# Patient Record
Sex: FEMALE
[De-identification: names, ages, dates, MRNs, and addresses within clinical notes are randomized per-mention and may not be internally consistent; named-entity substitution may affect disease eponyms.]

---

## 2017-12-16 ENCOUNTER — LAB SERVICES (OUTPATIENT)
Dept: OTHER | Age: 37
End: 2017-12-16

## 2017-12-16 ENCOUNTER — CHARTING TRANS (OUTPATIENT)
Dept: OTHER | Age: 37
End: 2017-12-16

## 2017-12-16 LAB — RAPID STREP GROUP A: POSITIVE

## 2018-04-02 ENCOUNTER — LAB SERVICES (OUTPATIENT)
Dept: OTHER | Age: 38
End: 2018-04-02

## 2018-04-02 ENCOUNTER — CHARTING TRANS (OUTPATIENT)
Dept: OTHER | Age: 38
End: 2018-04-02

## 2018-04-02 LAB — RAPID STREP GROUP A: POSITIVE

## 2018-04-02 ASSESSMENT — PAIN SCALES - GENERAL: PAINLEVEL_OUTOF10: 6

## 2018-05-31 ENCOUNTER — HOSPITAL ENCOUNTER (OUTPATIENT)
Dept: ULTRASOUND IMAGING | Age: 38
Discharge: HOME | End: 2018-05-31
Attending: INTERNAL MEDICINE
Payer: COMMERCIAL

## 2018-05-31 ENCOUNTER — LAB ENCOUNTER (OUTPATIENT)
Dept: LAB | Age: 38
End: 2018-05-31
Attending: INTERNAL MEDICINE
Payer: COMMERCIAL

## 2018-05-31 ENCOUNTER — OFFICE VISIT (OUTPATIENT)
Dept: FAMILY MEDICINE CLINIC | Facility: CLINIC | Age: 38
End: 2018-05-31

## 2018-05-31 VITALS
HEART RATE: 78 BPM | RESPIRATION RATE: 16 BRPM | DIASTOLIC BLOOD PRESSURE: 70 MMHG | BODY MASS INDEX: 26.2 KG/M2 | TEMPERATURE: 98 F | WEIGHT: 163 LBS | HEIGHT: 66 IN | SYSTOLIC BLOOD PRESSURE: 114 MMHG

## 2018-05-31 DIAGNOSIS — E04.9 GOITER: ICD-10-CM

## 2018-05-31 DIAGNOSIS — E03.9 ACQUIRED HYPOTHYROIDISM: ICD-10-CM

## 2018-05-31 DIAGNOSIS — F41.9 ANXIETY: ICD-10-CM

## 2018-05-31 DIAGNOSIS — K58.0 IRRITABLE BOWEL SYNDROME WITH DIARRHEA: ICD-10-CM

## 2018-05-31 DIAGNOSIS — Z00.00 ANNUAL PHYSICAL EXAM: Primary | ICD-10-CM

## 2018-05-31 DIAGNOSIS — Z00.00 ANNUAL PHYSICAL EXAM: ICD-10-CM

## 2018-05-31 PROCEDURE — 99386 PREV VISIT NEW AGE 40-64: CPT | Performed by: INTERNAL MEDICINE

## 2018-05-31 PROCEDURE — 86003 ALLG SPEC IGE CRUDE XTRC EA: CPT | Performed by: INTERNAL MEDICINE

## 2018-05-31 PROCEDURE — 99203 OFFICE O/P NEW LOW 30 MIN: CPT | Performed by: INTERNAL MEDICINE

## 2018-05-31 PROCEDURE — 76536 US EXAM OF HEAD AND NECK: CPT | Performed by: INTERNAL MEDICINE

## 2018-05-31 PROCEDURE — 82306 VITAMIN D 25 HYDROXY: CPT | Performed by: INTERNAL MEDICINE

## 2018-05-31 PROCEDURE — 80050 GENERAL HEALTH PANEL: CPT | Performed by: INTERNAL MEDICINE

## 2018-05-31 PROCEDURE — 36415 COLL VENOUS BLD VENIPUNCTURE: CPT | Performed by: INTERNAL MEDICINE

## 2018-05-31 PROCEDURE — 80061 LIPID PANEL: CPT | Performed by: INTERNAL MEDICINE

## 2018-05-31 PROCEDURE — 84439 ASSAY OF FREE THYROXINE: CPT | Performed by: INTERNAL MEDICINE

## 2018-05-31 RX ORDER — HYDROXYZINE HYDROCHLORIDE 25 MG/1
25 TABLET, FILM COATED ORAL
COMMUNITY
Start: 2018-03-23 | End: 2018-05-31

## 2018-05-31 RX ORDER — HYDROXYZINE HYDROCHLORIDE 25 MG/1
TABLET, FILM COATED ORAL
Refills: 0 | COMMUNITY
Start: 2018-03-23 | End: 2018-05-31

## 2018-05-31 RX ORDER — VALACYCLOVIR HYDROCHLORIDE 1 G/1
TABLET, FILM COATED ORAL
Qty: 15 TABLET | Refills: 1 | Status: SHIPPED | OUTPATIENT
Start: 2018-05-31 | End: 2019-03-04

## 2018-05-31 RX ORDER — CLINDAMYCIN AND BENZOYL PEROXIDE 10; 50 MG/G; MG/G
GEL TOPICAL
COMMUNITY
Start: 2017-05-09 | End: 2018-08-13

## 2018-05-31 RX ORDER — VALACYCLOVIR HYDROCHLORIDE 1 G/1
TABLET, FILM COATED ORAL
COMMUNITY
Start: 2018-05-04 | End: 2018-05-31

## 2018-05-31 RX ORDER — BUPROPION HYDROCHLORIDE 150 MG/1
150 TABLET ORAL DAILY
Qty: 30 TABLET | Refills: 1 | Status: SHIPPED | OUTPATIENT
Start: 2018-05-31 | End: 2018-05-31

## 2018-05-31 RX ORDER — BUSPIRONE HYDROCHLORIDE 5 MG/1
TABLET ORAL
Qty: 15 TABLET | Refills: 0 | Status: SHIPPED | OUTPATIENT
Start: 2018-05-31 | End: 2018-08-13

## 2018-05-31 NOTE — PATIENT INSTRUCTIONS
LAB HOURS & LOCATIONS        [facility]    [facility]   [address] [place] [address]     ([address])         [address] [place], [place] [address]    Mon-Fri  5am-8pm     Mon-Fri   7am-4pm  Sat         6am-3pm     Sat          7am-3pm      Alcides oTribio

## 2018-06-02 RX ORDER — BUPROPION HYDROCHLORIDE 150 MG/1
TABLET ORAL
Qty: 90 TABLET | Refills: 1 | Status: SHIPPED | OUTPATIENT
Start: 2018-06-02 | End: 2018-08-13

## 2018-06-04 ENCOUNTER — TELEPHONE (OUTPATIENT)
Dept: FAMILY MEDICINE CLINIC | Facility: CLINIC | Age: 38
End: 2018-06-04

## 2018-06-04 NOTE — TELEPHONE ENCOUNTER
Pt called to request a call back regarding her US thyroid test results. Pt is asking for the next step is she due for a biopsy? Please call and advise.

## 2018-06-12 PROCEDURE — 88173 CYTOPATH EVAL FNA REPORT: CPT | Performed by: OTOLARYNGOLOGY

## 2018-06-18 NOTE — PROGRESS NOTES
HPI:   Jarrett Hatfield is a 34year old female, new to our practice,  who presents for an annual physical exam. Symptoms: denies discharge, itching, burning or dysuria, sees GYNE. Patient's last menstrual period was 05/25/2018.   Previous pap: up to date watches sugar closely     REVIEW OF SYSTEMS:   GENERAL: feels well otherwise  SKIN: denies unusual skin lesions  HEENT:no vision or hearing changes; denies nasal congestion, sinus pain or sore throat  LUNGS: denies shortness of breath, chest heaviness or c prn buspar. Risks & SEs discussed. 3. Acquired hypothyroidism  - update labs  - US THYROID (MSR=15120); Future    4. Goiter  - US THYROID (PLZ=93521); Future    5.  Irritable bowel syndrome with diarrhea  - routine exercise and stress relieving modaliti

## 2018-07-28 ENCOUNTER — HOSPITAL ENCOUNTER (OUTPATIENT)
Dept: MAMMOGRAPHY | Facility: HOSPITAL | Age: 38
Discharge: HOME | End: 2018-07-28
Attending: ADVANCED PRACTICE MIDWIFE
Payer: COMMERCIAL

## 2018-07-28 DIAGNOSIS — Z12.31 ENCOUNTER FOR SCREENING MAMMOGRAM FOR MALIGNANT NEOPLASM OF BREAST: ICD-10-CM

## 2018-07-28 PROCEDURE — 77067 SCR MAMMO BI INCL CAD: CPT | Performed by: ADVANCED PRACTICE MIDWIFE

## 2018-07-28 PROCEDURE — 77063 BREAST TOMOSYNTHESIS BI: CPT | Performed by: ADVANCED PRACTICE MIDWIFE

## 2018-08-07 ENCOUNTER — HOSPITAL ENCOUNTER (OUTPATIENT)
Dept: MAMMOGRAPHY | Facility: HOSPITAL | Age: 38
Discharge: HOME | End: 2018-08-07
Attending: ADVANCED PRACTICE MIDWIFE
Payer: COMMERCIAL

## 2018-08-07 DIAGNOSIS — R92.2 INCONCLUSIVE MAMMOGRAM: ICD-10-CM

## 2018-08-07 PROCEDURE — 77061 BREAST TOMOSYNTHESIS UNI: CPT | Performed by: ADVANCED PRACTICE MIDWIFE

## 2018-08-07 PROCEDURE — 77065 DX MAMMO INCL CAD UNI: CPT | Performed by: ADVANCED PRACTICE MIDWIFE

## 2018-08-07 PROCEDURE — 76642 ULTRASOUND BREAST LIMITED: CPT | Performed by: ADVANCED PRACTICE MIDWIFE

## 2018-08-13 PROBLEM — F41.9 ANXIETY: Status: ACTIVE | Noted: 2018-08-13

## 2018-08-13 NOTE — PROGRESS NOTES
HPI:   Claudeen Hudson is a 34year old female who presents for follow up of depression & anxiety. She is doing well on medication. Denies side effects. Not seeing a psychologist.   Denies suicidal and homicidal thoughts.   Used Buspar in the beginning, h ASSESSMENT AND PLAN:   Anxiety/Depression, stable-   Continue wellbutrin 150mg XR as directed. May increase to 2 tabs daily to see if she feels better on 300mg. Buspar prn. Expected course and possible side effects reviewed.  Reinforced stress managem

## 2018-08-13 NOTE — PATIENT INSTRUCTIONS
Treating Hemorrhoids: Self-Care    Follow your healthcare provider’s advice about caring for your hemorrhoids at home. Some treatments help relieve symptoms right away. Others involve making changes in your diet and exercise habits.  These can help ease c Adding fiber to your diet can help relieve constipation by making stools softer and easier to pass. To increase your fiber intake, your healthcare provider may recommend a bulking agent, such as psyllium.  This is a high-fiber supplement available at most g The tips below offer some simple ways to add more high-fiber foods to your meals. · Start your day with a high-fiber breakfast. Eat a wheat bran cereal along with a sliced banana.  Or, try peanut butter on whole-wheat toast.  · Eat carrot sticks for snacks

## 2018-10-29 ENCOUNTER — APPOINTMENT (OUTPATIENT)
Dept: LAB | Age: 38
End: 2018-10-29
Attending: NURSE PRACTITIONER
Payer: COMMERCIAL

## 2018-10-29 ENCOUNTER — OFFICE VISIT (OUTPATIENT)
Dept: INTERNAL MEDICINE CLINIC | Facility: CLINIC | Age: 38
End: 2018-10-29
Payer: COMMERCIAL

## 2018-10-29 VITALS
HEIGHT: 65 IN | SYSTOLIC BLOOD PRESSURE: 110 MMHG | HEART RATE: 60 BPM | WEIGHT: 170 LBS | RESPIRATION RATE: 16 BRPM | BODY MASS INDEX: 28.32 KG/M2 | DIASTOLIC BLOOD PRESSURE: 70 MMHG

## 2018-10-29 DIAGNOSIS — E66.3 OVERWEIGHT (BMI 25.0-29.9): ICD-10-CM

## 2018-10-29 DIAGNOSIS — E55.9 VITAMIN D DEFICIENCY: ICD-10-CM

## 2018-10-29 DIAGNOSIS — R53.83 TIREDNESS: ICD-10-CM

## 2018-10-29 DIAGNOSIS — Z51.81 ENCOUNTER FOR THERAPEUTIC DRUG MONITORING: ICD-10-CM

## 2018-10-29 DIAGNOSIS — F43.9 STRESS: ICD-10-CM

## 2018-10-29 DIAGNOSIS — Z51.81 ENCOUNTER FOR THERAPEUTIC DRUG MONITORING: Primary | ICD-10-CM

## 2018-10-29 PROCEDURE — 99204 OFFICE O/P NEW MOD 45 MIN: CPT | Performed by: NURSE PRACTITIONER

## 2018-10-29 PROCEDURE — 82607 VITAMIN B-12: CPT | Performed by: NURSE PRACTITIONER

## 2018-10-29 PROCEDURE — 82397 CHEMILUMINESCENT ASSAY: CPT | Performed by: NURSE PRACTITIONER

## 2018-10-29 PROCEDURE — 83036 HEMOGLOBIN GLYCOSYLATED A1C: CPT | Performed by: NURSE PRACTITIONER

## 2018-10-29 PROCEDURE — 93000 ELECTROCARDIOGRAM COMPLETE: CPT | Performed by: NURSE PRACTITIONER

## 2018-10-29 PROCEDURE — 82306 VITAMIN D 25 HYDROXY: CPT | Performed by: NURSE PRACTITIONER

## 2018-10-29 RX ORDER — PHENTERMINE HYDROCHLORIDE 15 MG/1
15 CAPSULE ORAL EVERY MORNING
Qty: 30 CAPSULE | Refills: 1 | Status: SHIPPED | OUTPATIENT
Start: 2018-10-29 | End: 2018-12-10

## 2018-10-29 RX ORDER — FLUOXETINE HYDROCHLORIDE 20 MG/1
CAPSULE ORAL
Qty: 90 CAPSULE | Refills: 0 | Status: SHIPPED | OUTPATIENT
Start: 2018-10-29 | End: 2018-12-10

## 2018-10-29 RX ORDER — FLUOXETINE HYDROCHLORIDE 20 MG/1
20 CAPSULE ORAL DAILY
Qty: 30 CAPSULE | Refills: 1 | Status: SHIPPED | OUTPATIENT
Start: 2018-10-29 | End: 2018-10-29

## 2018-10-29 NOTE — PROGRESS NOTES
HISTORY OF PRESENT ILLNESS  Patient presents with:  Weight Problem: patient referred by   HENRIK Ward is a 34year old female new to our office today for initiation of medical weight loss program.  Patient presents today wi obesity in parent/s or sibling: ?     REVIEW OF SYSTEMS  GENERAL: feels well otherwise, +fatigue  SKIN: denies rashes to skin folds  HEENT: denies snoring  LUNGS: denies shortness of breath with exertion, no apnea  CARDIOVASCULAR: denies chest pain on exert 05/31/2018    ALB 3.9 05/31/2018     05/31/2018    K 4.1 05/31/2018     05/31/2018    CO2 30.0 05/31/2018     No results found for: EAG, A1C  Lab Results   Component Value Date    CHOLEST 192 05/31/2018    TRIG 82 05/31/2018    HDL 52 05/31/201 Future    Tiredness  - multiple factors, could be stress, nutrient deficient  -     HEMOGLOBIN A1C; Future  -     VITAMIN B12; Future    Stress  - see above overweight plan  - CALM raphael recommended, offered counseling services- pt defers  -     FLUoxetine H as well as rice, pasta, potatoes and bread and make sure to choose whole grain options when having them.   4.  Start a daily probiotic: VSL#3, 1 capsule daily with water- find this over the counter behind the pharmacist. This can be left out of the fridge f

## 2018-10-29 NOTE — PATIENT INSTRUCTIONS
Welcome to the [facility]...your Lifestyle Renovation begins now! Thank you for placing your trust in our health care team, I look forward to working with you along this journey to better health!     Next steps:     1.  Sched progress as able with long-term goal of 30 minutes 5 days a week at a minimum. Meditation daily can help manage and control stress. Chronic stress can make weight loss difficult.   Exercising is one way to help with stress, but meditation using the CALM

## 2018-11-01 VITALS
TEMPERATURE: 99.5 F | RESPIRATION RATE: 18 BRPM | WEIGHT: 160 LBS | BODY MASS INDEX: 26.66 KG/M2 | HEART RATE: 68 BPM | HEIGHT: 65 IN | DIASTOLIC BLOOD PRESSURE: 68 MMHG | SYSTOLIC BLOOD PRESSURE: 118 MMHG

## 2018-11-02 VITALS
BODY MASS INDEX: 26.74 KG/M2 | OXYGEN SATURATION: 97 % | WEIGHT: 160.49 LBS | RESPIRATION RATE: 16 BRPM | TEMPERATURE: 99.3 F | HEART RATE: 102 BPM | HEIGHT: 65 IN

## 2018-11-02 DIAGNOSIS — E55.9 VITAMIN D DEFICIENCY: Primary | ICD-10-CM

## 2018-11-02 RX ORDER — ERGOCALCIFEROL 1.25 MG/1
50000 CAPSULE ORAL WEEKLY
Qty: 12 CAPSULE | Refills: 0 | Status: SHIPPED | OUTPATIENT
Start: 2018-11-02 | End: 2019-02-18

## 2018-12-10 ENCOUNTER — OFFICE VISIT (OUTPATIENT)
Dept: INTERNAL MEDICINE CLINIC | Facility: CLINIC | Age: 38
End: 2018-12-10
Payer: COMMERCIAL

## 2018-12-10 VITALS
BODY MASS INDEX: 26.16 KG/M2 | RESPIRATION RATE: 16 BRPM | WEIGHT: 157 LBS | SYSTOLIC BLOOD PRESSURE: 100 MMHG | HEIGHT: 65 IN | DIASTOLIC BLOOD PRESSURE: 60 MMHG | HEART RATE: 68 BPM

## 2018-12-10 DIAGNOSIS — E55.9 VITAMIN D DEFICIENCY: ICD-10-CM

## 2018-12-10 DIAGNOSIS — Z51.81 ENCOUNTER FOR THERAPEUTIC DRUG MONITORING: Primary | ICD-10-CM

## 2018-12-10 DIAGNOSIS — E66.3 OVERWEIGHT (BMI 25.0-29.9): ICD-10-CM

## 2018-12-10 DIAGNOSIS — F43.9 STRESS: ICD-10-CM

## 2018-12-10 PROCEDURE — 99214 OFFICE O/P EST MOD 30 MIN: CPT | Performed by: NURSE PRACTITIONER

## 2018-12-10 RX ORDER — PHENTERMINE HYDROCHLORIDE 15 MG/1
15 CAPSULE ORAL EVERY MORNING
Qty: 30 CAPSULE | Refills: 0 | Status: SHIPPED | OUTPATIENT
Start: 2018-12-10 | End: 2019-02-05 | Stop reason: DRUGHIGH

## 2018-12-10 RX ORDER — FLUOXETINE HYDROCHLORIDE 20 MG/1
CAPSULE ORAL
Qty: 90 CAPSULE | Refills: 0 | Status: SHIPPED | OUTPATIENT
Start: 2018-12-10 | End: 2019-03-04

## 2018-12-10 NOTE — PATIENT INSTRUCTIONS
Congratulations on your 13# weight loss! Continue making lifestyle changes that focus on good nutrition, regular exercise and stress management. Today we reviewed your labs with findings of: Low Vitamin D- continue/start Vitamin D weekly as prescribed. Doctor @ www. dietdoctor. com - low carb swaps    Stress Management/Behavior  · CALM meditation raphael  · Headspace  · Podcast: The Bonnie Flores with Phuc Fox    Books/Video Education  · Mindless Eating by Shahida Trimble  · The Complete Guide

## 2018-12-10 NOTE — PROGRESS NOTES
Vasquez Villa is a 34year old female presents today for 1 month follow-up on medical weight loss program for the treatment of overweight, obesity, or morbid obesity with associated vitamin d deficiency.     S:  Current weight Wt Readings from Last 6 Encoun affect    ASSESSMENT AND PLAN:  Encounter for therapeutic drug monitoring  (primary encounter diagnosis)  Overweight (bmi 25.0-29. 9)  Stress  Vitamin d deficiency    No orders of the defined types were placed in this encounter.       Meds & Refills for this therapy department and led by a /exercise physiologist. Visit our PT department downstairs or call [phone] to get started. · [employer] @ [email or web address] [email or web address].  Personal training and Group Fitness classes with [employer]

## 2019-02-02 ENCOUNTER — WALK IN (OUTPATIENT)
Dept: URGENT CARE | Age: 39
End: 2019-02-02

## 2019-02-02 VITALS
BODY MASS INDEX: 25.83 KG/M2 | RESPIRATION RATE: 16 BRPM | SYSTOLIC BLOOD PRESSURE: 110 MMHG | HEART RATE: 84 BPM | HEIGHT: 65 IN | OXYGEN SATURATION: 100 % | DIASTOLIC BLOOD PRESSURE: 72 MMHG | TEMPERATURE: 98.5 F | WEIGHT: 155 LBS

## 2019-02-02 DIAGNOSIS — R05.9 COUGH: ICD-10-CM

## 2019-02-02 DIAGNOSIS — J06.9 VIRAL URI: Primary | ICD-10-CM

## 2019-02-02 PROCEDURE — 99203 OFFICE O/P NEW LOW 30 MIN: CPT | Performed by: OBSTETRICS & GYNECOLOGY

## 2019-02-02 RX ORDER — BUPROPION HYDROCHLORIDE 150 MG/1
150 TABLET ORAL DAILY
COMMUNITY

## 2019-02-02 RX ORDER — BENZONATATE 200 MG/1
200 CAPSULE ORAL 3 TIMES DAILY PRN
Qty: 30 CAPSULE | Refills: 0 | Status: SHIPPED | OUTPATIENT
Start: 2019-02-02 | End: 2019-02-12

## 2019-02-02 ASSESSMENT — ENCOUNTER SYMPTOMS
WHEEZING: 0
DIAPHORESIS: 0
FEVER: 0
NAUSEA: 0
SHORTNESS OF BREATH: 0
VOMITING: 0
SORE THROAT: 0
DIARRHEA: 0
CHILLS: 0
COUGH: 1

## 2019-02-04 ENCOUNTER — OFFICE VISIT (OUTPATIENT)
Dept: INTERNAL MEDICINE CLINIC | Facility: CLINIC | Age: 39
End: 2019-02-04
Payer: COMMERCIAL

## 2019-02-04 VITALS — BODY MASS INDEX: 26 KG/M2 | WEIGHT: 158 LBS

## 2019-02-04 DIAGNOSIS — E66.3 OVERWEIGHT WITH BODY MASS INDEX (BMI) 25.0-29.9: Primary | ICD-10-CM

## 2019-02-04 PROCEDURE — 97802 MEDICAL NUTRITION INDIV IN: CPT | Performed by: DIETITIAN, REGISTERED

## 2019-02-04 NOTE — PROGRESS NOTES
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 Weight change: Increase of 1 lbs in the past month    Current Diet: Whole 30 initial phase, grain, sugar and dairy free    Meal pattern: 1-2 meals/d, ?  snacks/d    Number of meals/week eaten at restaurants: 1-2, w

## 2019-02-05 ENCOUNTER — OFFICE VISIT (OUTPATIENT)
Dept: INTERNAL MEDICINE CLINIC | Facility: CLINIC | Age: 39
End: 2019-02-05
Payer: COMMERCIAL

## 2019-02-05 VITALS
SYSTOLIC BLOOD PRESSURE: 100 MMHG | HEIGHT: 65 IN | HEART RATE: 70 BPM | DIASTOLIC BLOOD PRESSURE: 60 MMHG | WEIGHT: 156 LBS | BODY MASS INDEX: 25.99 KG/M2 | RESPIRATION RATE: 14 BRPM

## 2019-02-05 DIAGNOSIS — Z51.81 ENCOUNTER FOR THERAPEUTIC DRUG MONITORING: Primary | ICD-10-CM

## 2019-02-05 DIAGNOSIS — E66.3 OVERWEIGHT (BMI 25.0-29.9): ICD-10-CM

## 2019-02-05 PROCEDURE — 99213 OFFICE O/P EST LOW 20 MIN: CPT | Performed by: NURSE PRACTITIONER

## 2019-02-05 RX ORDER — PHENTERMINE HYDROCHLORIDE 15 MG/1
15 CAPSULE ORAL EVERY MORNING
Qty: 30 CAPSULE | Refills: 0 | Status: CANCELLED | OUTPATIENT
Start: 2019-02-05

## 2019-02-05 RX ORDER — PHENTERMINE HYDROCHLORIDE 37.5 MG/1
37.5 TABLET ORAL EVERY MORNING
Qty: 30 TABLET | Refills: 0 | Status: SHIPPED | OUTPATIENT
Start: 2019-02-05 | End: 2019-04-29

## 2019-02-05 NOTE — PROGRESS NOTES
Prashant Prajapati is a 34year old female presents today for 3 month follow-up on medical weight loss program for the treatment of overweight, obesity, or morbid obesity with associated vitamin d deficiency.     S:  Current weight Wt Readings from Last 6 Encoun intact  PSYCH: pleasant, cooperative, normal mood and affect    ASSESSMENT AND PLAN:  Encounter for therapeutic drug monitoring  (primary encounter diagnosis)  Overweight (bmi 25.0-29. 9)    No orders of the defined types were placed in this encounter. after a meal), whereas emotional eating and cravings usually come on very suddenly.  When you are genuinely hungry, you may experience one or several of the symptoms listed below:  Stomach pangs or growling   Emptiness in the stomach   Irritability   Headac ___________________________________________________________________  Taken from the American Diabetes Association @ www.diabetes. org. Last Edited: March 19, 2014, reviewed December 17, 2013            Medication use and SEs reviewed with patient.     Ret

## 2019-02-05 NOTE — PATIENT INSTRUCTIONS
Resume making lifestyle changes that focus on good nutrition, regular exercise and stress management. Medication Plan: Continue current medication regimen aside from increase phentermine dose.     Agreed upon goal/s before next office visit include: Orion Stewart eating in response to an emotion or because you are experiencing physical hunger? Think of alternatives to eating for when these temptations arise.  Some ideas are:  Drink a glass of cold water or another zero-calorie beverage   Take a walk to change the s

## 2019-02-09 DIAGNOSIS — E55.9 VITAMIN D DEFICIENCY: ICD-10-CM

## 2019-02-11 RX ORDER — ERGOCALCIFEROL 1.25 MG/1
CAPSULE ORAL
Qty: 12 CAPSULE | Refills: 0 | OUTPATIENT
Start: 2019-02-11

## 2019-02-11 NOTE — TELEPHONE ENCOUNTER
Requesting ergocalciferol 59413 units Oral    LOV: 2/5/19    RTC: 1 mth  Last Relevant Labs: 10/29/18  Filled: 11/2/18 # 12 with 0 refills    Future Appointments   Date Time Provider Gregorio Del Valle   3/6/2019 10:00 AM RYAN Herman [facility]

## 2019-02-18 DIAGNOSIS — E55.9 VITAMIN D DEFICIENCY: ICD-10-CM

## 2019-02-18 RX ORDER — BUPROPION HYDROCHLORIDE 150 MG/1
TABLET ORAL
Qty: 90 TABLET | Refills: 0 | OUTPATIENT
Start: 2019-02-18

## 2019-02-19 RX ORDER — ERGOCALCIFEROL 1.25 MG/1
CAPSULE ORAL
Qty: 12 CAPSULE | Refills: 0 | Status: SHIPPED | OUTPATIENT
Start: 2019-02-19 | End: 2019-07-11

## 2019-02-19 NOTE — TELEPHONE ENCOUNTER
Requesting Vitamin D  LOV: 2/5/19  RTC: 1 month  Last Relevant Labs: 10/29/18  Filled: 11/2/18 #12 with 0 refills    Future Appointments   Date Time Provider Gregorio Del Valle   3/6/2019 10:00 AM RYAN Cutler [facility]     Is patient to

## 2019-03-05 NOTE — PROGRESS NOTES
HPI:   Natalie Casanova is a 37year old female who presents for follow up of depression & anxiety. She is doing very well on medication. Denies side effects. Not seeing a psychologist.   Denies suicidal and homicidal thoughts.     Feeling like she's in a REFILLS FOR THIS VISIT:     Requested Prescriptions     Signed Prescriptions Disp Refills   • buPROPion HCl ER, XL, 150 MG Oral Tablet 24 Hr 90 tablet 2     Sig: TAKE 1 TABLET(150 MG) BY MOUTH DAILY   • FLUoxetine HCl 20 MG Oral Cap 90 capsule 1     Sig: T

## 2019-04-29 ENCOUNTER — OFFICE VISIT (OUTPATIENT)
Dept: INTERNAL MEDICINE CLINIC | Facility: CLINIC | Age: 39
End: 2019-04-29
Payer: COMMERCIAL

## 2019-04-29 VITALS
BODY MASS INDEX: 26.33 KG/M2 | HEART RATE: 76 BPM | DIASTOLIC BLOOD PRESSURE: 80 MMHG | RESPIRATION RATE: 14 BRPM | HEIGHT: 65 IN | SYSTOLIC BLOOD PRESSURE: 120 MMHG | WEIGHT: 158 LBS

## 2019-04-29 DIAGNOSIS — Z71.82 EXERCISE COUNSELING: ICD-10-CM

## 2019-04-29 DIAGNOSIS — Z71.3 DIETARY COUNSELING AND SURVEILLANCE: ICD-10-CM

## 2019-04-29 DIAGNOSIS — E66.3 OVERWEIGHT (BMI 25.0-29.9): ICD-10-CM

## 2019-04-29 DIAGNOSIS — Z51.81 ENCOUNTER FOR THERAPEUTIC DRUG MONITORING: Primary | ICD-10-CM

## 2019-04-29 DIAGNOSIS — Z71.3 WEIGHT LOSS COUNSELING, ENCOUNTER FOR: ICD-10-CM

## 2019-04-29 PROCEDURE — 99213 OFFICE O/P EST LOW 20 MIN: CPT | Performed by: NURSE PRACTITIONER

## 2019-04-29 RX ORDER — PHENTERMINE HYDROCHLORIDE 37.5 MG/1
37.5 TABLET ORAL EVERY MORNING
Qty: 30 TABLET | Refills: 2 | Status: SHIPPED | OUTPATIENT
Start: 2019-04-29 | End: 2019-07-11

## 2019-04-29 NOTE — PROGRESS NOTES
Prashant Prajapati is a 37year old female presents today for 6 month follow-up on medical weight loss program for the treatment of overweight, obesity, or morbid obesity with associated vitamin d deficiency.     S:  Current weight Wt Readings from Last 6 Encoun counseling, encounter for  Dietary counseling and surveillance  Exercise counseling    No orders of the defined types were placed in this encounter.       Meds & Refills for this Visit:  Requested Prescriptions     Signed Prescriptions Disp Refills   • Phen weight loss has ranged from 1 year to 66 years!  o Some have lost the weight rapidly, while others have lost weight very slowly--over as many as 14 years.       • We have also started to learn about how the weight loss was accomplished: 45% of registry part Exertion/Intensity on a scale of 1-5, with 5 being very intense: 3-4    Stress/Sleep:    Average Stress Level on scale of 1-10, with 10 being extremely stressed: variable/10    Average Hours of uninterrupted sleep per night: 5-6 hours/night    Additional L

## 2019-04-29 NOTE — PATIENT INSTRUCTIONS
Resume making lifestyle changes that focus on good nutrition, regular exercise and stress management. Medication Plan: Continue current medication regimen. Option to add medication if difficulty with portions persists.     Agreed upon goal/s before next per day. [facility] Control Registry @ www. [facility]. ws

## 2019-07-11 ENCOUNTER — OFFICE VISIT (OUTPATIENT)
Dept: FAMILY MEDICINE CLINIC | Facility: CLINIC | Age: 39
End: 2019-07-11
Payer: COMMERCIAL

## 2019-07-11 VITALS
SYSTOLIC BLOOD PRESSURE: 122 MMHG | BODY MASS INDEX: 25.16 KG/M2 | TEMPERATURE: 98 F | DIASTOLIC BLOOD PRESSURE: 82 MMHG | WEIGHT: 151 LBS | RESPIRATION RATE: 18 BRPM | OXYGEN SATURATION: 98 % | HEIGHT: 65 IN | HEART RATE: 104 BPM

## 2019-07-11 DIAGNOSIS — F41.9 ANXIETY: ICD-10-CM

## 2019-07-11 DIAGNOSIS — Z00.00 ROUTINE GENERAL MEDICAL EXAMINATION AT A HEALTH CARE FACILITY: Primary | ICD-10-CM

## 2019-07-11 DIAGNOSIS — E04.2 MULTIPLE THYROID NODULES: ICD-10-CM

## 2019-07-11 PROCEDURE — 99396 PREV VISIT EST AGE 40-64: CPT | Performed by: INTERNAL MEDICINE

## 2019-07-11 RX ORDER — VALACYCLOVIR HYDROCHLORIDE 1 G/1
TABLET, FILM COATED ORAL
Qty: 15 TABLET | Refills: 3 | Status: SHIPPED | OUTPATIENT
Start: 2019-07-11 | End: 2021-09-07

## 2019-07-11 RX ORDER — FLUOXETINE 10 MG/1
10 TABLET, FILM COATED ORAL DAILY
Qty: 90 TABLET | Refills: 3 | Status: SHIPPED | OUTPATIENT
Start: 2019-07-11 | End: 2020-02-19

## 2019-07-11 NOTE — PROGRESS NOTES
HPI:   Leonardo Mcqueen is a 37year old female who presents for an annual physical. Symptoms: denies discharge, itching, burning or dysuria, periods are regular, flow is 5 days. Patient's last menstrual period was 06/17/2019.   Previous pap: 2019 normal pe yes.   Exercise: three times per week.   Diet: watches fats closely and low carb diet     REVIEW OF SYSTEMS:   GENERAL: feels well otherwise  SKIN: denies unusual skin lesions  HEENT:no vision or hearing changes; denies nasal congestion, sinus pain or sore Future  - LIPID PANEL; Future  - TSH W REFLEX TO FREE T4; Future  - VITAMIN D, 25-HYDROXY; Future    2. Multiple thyroid nodules  - US THYROID (VAS=07176); Future    3. Anxiety, stable  Reduce fluoxetine to 10mg daily, may cut down to 5mg if tolerable.   Eduard Herreras

## 2019-08-06 ENCOUNTER — HOSPITAL ENCOUNTER (OUTPATIENT)
Dept: ULTRASOUND IMAGING | Facility: HOSPITAL | Age: 39
Discharge: HOME | End: 2019-08-06
Attending: INTERNAL MEDICINE
Payer: COMMERCIAL

## 2019-08-06 ENCOUNTER — LAB ENCOUNTER (OUTPATIENT)
Dept: LAB | Facility: HOSPITAL | Age: 39
End: 2019-08-06
Attending: INTERNAL MEDICINE
Payer: COMMERCIAL

## 2019-08-06 DIAGNOSIS — R79.89 ABNORMAL THYROID BLOOD TEST: Primary | ICD-10-CM

## 2019-08-06 DIAGNOSIS — Z00.00 ROUTINE GENERAL MEDICAL EXAMINATION AT A HEALTH CARE FACILITY: ICD-10-CM

## 2019-08-06 DIAGNOSIS — E04.2 MULTIPLE THYROID NODULES: ICD-10-CM

## 2019-08-06 LAB
ALBUMIN SERPL-MCNC: 3.7 G/DL (ref 3.4–5)
ALBUMIN/GLOB SERPL: 1 {RATIO} (ref 1–2)
ALP LIVER SERPL-CCNC: 72 U/L (ref 37–98)
ALT SERPL-CCNC: 17 U/L (ref 13–56)
ANION GAP SERPL CALC-SCNC: 5 MMOL/L (ref 0–18)
AST SERPL-CCNC: 14 U/L (ref 15–37)
BASOPHILS # BLD AUTO: 0.04 X10(3) UL (ref 0–0.2)
BASOPHILS NFR BLD AUTO: 0.7 %
BILIRUB SERPL-MCNC: 0.8 MG/DL (ref 0.1–2)
BUN BLD-MCNC: 12 MG/DL (ref 7–18)
BUN/CREAT SERPL: 15.8 (ref 10–20)
CALCIUM BLD-MCNC: 8.4 MG/DL (ref 8.5–10.1)
CHLORIDE SERPL-SCNC: 103 MMOL/L (ref 98–112)
CHOLEST SMN-MCNC: 220 MG/DL (ref ?–200)
CO2 SERPL-SCNC: 30 MMOL/L (ref 21–32)
CREAT BLD-MCNC: 0.76 MG/DL (ref 0.55–1.02)
DEPRECATED RDW RBC AUTO: 43.8 FL (ref 35.1–46.3)
EOSINOPHIL # BLD AUTO: 0.28 X10(3) UL (ref 0–0.7)
EOSINOPHIL NFR BLD AUTO: 4.8 %
ERYTHROCYTE [DISTWIDTH] IN BLOOD BY AUTOMATED COUNT: 12.7 % (ref 11–15)
GLOBULIN PLAS-MCNC: 3.7 G/DL (ref 2.8–4.4)
GLUCOSE BLD-MCNC: 87 MG/DL (ref 70–99)
HCT VFR BLD AUTO: 42 % (ref 35–48)
HDLC SERPL-MCNC: 64 MG/DL (ref 40–59)
HGB BLD-MCNC: 13.5 G/DL (ref 12–16)
IMM GRANULOCYTES # BLD AUTO: 0.02 X10(3) UL (ref 0–1)
IMM GRANULOCYTES NFR BLD: 0.3 %
LDLC SERPL CALC-MCNC: 139 MG/DL (ref ?–100)
LYMPHOCYTES # BLD AUTO: 1.34 X10(3) UL (ref 1–4)
LYMPHOCYTES NFR BLD AUTO: 23.1 %
M PROTEIN MFR SERPL ELPH: 7.4 G/DL (ref 6.4–8.2)
MCH RBC QN AUTO: 29.9 PG (ref 26–34)
MCHC RBC AUTO-ENTMCNC: 32.1 G/DL (ref 31–37)
MCV RBC AUTO: 92.9 FL (ref 80–100)
MONOCYTES # BLD AUTO: 0.58 X10(3) UL (ref 0.1–1)
MONOCYTES NFR BLD AUTO: 10 %
NEUTROPHILS # BLD AUTO: 3.53 X10 (3) UL (ref 1.5–7.7)
NEUTROPHILS # BLD AUTO: 3.53 X10(3) UL (ref 1.5–7.7)
NEUTROPHILS NFR BLD AUTO: 61.1 %
NONHDLC SERPL-MCNC: 156 MG/DL (ref ?–130)
OSMOLALITY SERPL CALC.SUM OF ELEC: 285 MOSM/KG (ref 275–295)
PLATELET # BLD AUTO: 235 10(3)UL (ref 150–450)
POTASSIUM SERPL-SCNC: 3.8 MMOL/L (ref 3.5–5.1)
RBC # BLD AUTO: 4.52 X10(6)UL (ref 3.8–5.3)
SODIUM SERPL-SCNC: 138 MMOL/L (ref 136–145)
T4 FREE SERPL-MCNC: 1 NG/DL (ref 0.8–1.7)
TRIGL SERPL-MCNC: 83 MG/DL (ref 30–149)
TSI SER-ACNC: 5.85 MIU/ML (ref 0.36–3.74)
VIT D+METAB SERPL-MCNC: 26.3 NG/ML (ref 30–100)
VLDLC SERPL CALC-MCNC: 17 MG/DL (ref 0–30)
WBC # BLD AUTO: 5.8 X10(3) UL (ref 4–11)

## 2019-08-06 PROCEDURE — 85025 COMPLETE CBC W/AUTO DIFF WBC: CPT

## 2019-08-06 PROCEDURE — 82306 VITAMIN D 25 HYDROXY: CPT

## 2019-08-06 PROCEDURE — 80061 LIPID PANEL: CPT

## 2019-08-06 PROCEDURE — 80053 COMPREHEN METABOLIC PANEL: CPT

## 2019-08-06 PROCEDURE — 84439 ASSAY OF FREE THYROXINE: CPT

## 2019-08-06 PROCEDURE — 36415 COLL VENOUS BLD VENIPUNCTURE: CPT

## 2019-08-06 PROCEDURE — 84443 ASSAY THYROID STIM HORMONE: CPT

## 2019-08-06 PROCEDURE — 76536 US EXAM OF HEAD AND NECK: CPT | Performed by: INTERNAL MEDICINE

## 2019-08-27 ENCOUNTER — HOSPITAL ENCOUNTER (OUTPATIENT)
Dept: MAMMOGRAPHY | Facility: HOSPITAL | Age: 39
Discharge: HOME | End: 2019-08-27
Attending: ADVANCED PRACTICE MIDWIFE
Payer: COMMERCIAL

## 2019-08-27 PROCEDURE — 77067 SCR MAMMO BI INCL CAD: CPT | Performed by: ADVANCED PRACTICE MIDWIFE

## 2019-08-27 PROCEDURE — 77063 BREAST TOMOSYNTHESIS BI: CPT | Performed by: ADVANCED PRACTICE MIDWIFE

## 2019-10-02 ENCOUNTER — PATIENT MESSAGE (OUTPATIENT)
Dept: FAMILY MEDICINE CLINIC | Facility: CLINIC | Age: 39
End: 2019-10-02

## 2019-10-03 NOTE — TELEPHONE ENCOUNTER
From: Olesya Hayes  To: Mady Monge NP  Sent: 10/2/2019 4:59 PM CDT  Subject: Non-Urgent Medical Question    Robin Sue! Did you mention that I should re check my thyroid levels? Feeling a little symptomatic so I would like to if ok! Ioana VILLALTA

## 2019-12-30 ENCOUNTER — APPOINTMENT (OUTPATIENT)
Dept: LAB | Facility: HOSPITAL | Age: 39
End: 2019-12-30
Attending: OPTOMETRIST
Payer: COMMERCIAL

## 2019-12-30 DIAGNOSIS — R79.89 ABNORMAL THYROID BLOOD TEST: ICD-10-CM

## 2019-12-30 LAB — TSI SER-ACNC: 3.48 MIU/ML (ref 0.36–3.74)

## 2019-12-30 PROCEDURE — 84443 ASSAY THYROID STIM HORMONE: CPT

## 2019-12-30 PROCEDURE — 36415 COLL VENOUS BLD VENIPUNCTURE: CPT

## 2020-02-19 RX ORDER — FLUOXETINE 10 MG/1
10 TABLET, FILM COATED ORAL DAILY
Qty: 90 TABLET | Refills: 1 | Status: SHIPPED | OUTPATIENT
Start: 2020-02-19 | End: 2021-10-04

## 2020-03-07 ENCOUNTER — APPOINTMENT (OUTPATIENT)
Dept: URGENT CARE | Age: 40
End: 2020-03-07

## 2020-03-07 ENCOUNTER — WALK IN (OUTPATIENT)
Dept: URGENT CARE | Age: 40
End: 2020-03-07

## 2020-03-07 VITALS
HEIGHT: 66 IN | SYSTOLIC BLOOD PRESSURE: 106 MMHG | TEMPERATURE: 98.7 F | WEIGHT: 160 LBS | DIASTOLIC BLOOD PRESSURE: 68 MMHG | RESPIRATION RATE: 16 BRPM | BODY MASS INDEX: 25.71 KG/M2 | HEART RATE: 74 BPM

## 2020-03-07 DIAGNOSIS — H10.33 ACUTE CONJUNCTIVITIS OF BOTH EYES, UNSPECIFIED ACUTE CONJUNCTIVITIS TYPE: Primary | ICD-10-CM

## 2020-03-07 PROCEDURE — X0943 AMG SELF PAY VISIT: HCPCS | Performed by: NURSE PRACTITIONER

## 2020-03-07 RX ORDER — TOBRAMYCIN 3 MG/ML
1 SOLUTION/ DROPS OPHTHALMIC EVERY 6 HOURS
Qty: 5 ML | Refills: 0 | Status: SHIPPED | OUTPATIENT
Start: 2020-03-07 | End: 2020-03-12

## 2020-03-07 ASSESSMENT — ENCOUNTER SYMPTOMS
SINUS PRESSURE: 0
EYE REDNESS: 1
CHILLS: 0
EYE ITCHING: 0
VOICE CHANGE: 0
EYE PAIN: 0
TROUBLE SWALLOWING: 0
PHOTOPHOBIA: 0
FEVER: 0
RHINORRHEA: 0
COUGH: 0
EYE DISCHARGE: 1
FATIGUE: 0
SORE THROAT: 0

## 2020-06-19 DIAGNOSIS — Z11.59 SCREENING FOR VIRAL DISEASE: ICD-10-CM

## 2020-06-19 DIAGNOSIS — Z20.822 CLOSE EXPOSURE TO COVID-19 VIRUS: Primary | ICD-10-CM

## 2020-06-20 ENCOUNTER — LAB ENCOUNTER (OUTPATIENT)
Dept: LAB | Facility: HOSPITAL | Age: 40
End: 2020-06-20
Attending: EMERGENCY MEDICINE
Payer: COMMERCIAL

## 2020-06-20 DIAGNOSIS — Z20.822 CLOSE EXPOSURE TO COVID-19 VIRUS: ICD-10-CM

## 2020-06-20 DIAGNOSIS — Z11.59 SCREENING FOR VIRAL DISEASE: ICD-10-CM

## 2020-07-28 ENCOUNTER — HOSPITAL ENCOUNTER (OUTPATIENT)
Age: 40
Discharge: HOME | End: 2020-07-28
Payer: COMMERCIAL

## 2020-07-28 VITALS
BODY MASS INDEX: 28 KG/M2 | HEART RATE: 76 BPM | TEMPERATURE: 99 F | DIASTOLIC BLOOD PRESSURE: 77 MMHG | OXYGEN SATURATION: 100 % | WEIGHT: 168 LBS | RESPIRATION RATE: 16 BRPM | SYSTOLIC BLOOD PRESSURE: 115 MMHG

## 2020-07-28 DIAGNOSIS — B02.9 HERPES ZOSTER WITHOUT COMPLICATION: Primary | ICD-10-CM

## 2020-07-28 PROCEDURE — 99204 OFFICE O/P NEW MOD 45 MIN: CPT

## 2020-07-28 PROCEDURE — 99214 OFFICE O/P EST MOD 30 MIN: CPT

## 2020-07-28 PROCEDURE — 99213 OFFICE O/P EST LOW 20 MIN: CPT

## 2020-07-28 RX ORDER — VALACYCLOVIR HYDROCHLORIDE 1 G/1
1 TABLET, FILM COATED ORAL 3 TIMES DAILY
Qty: 21 TABLET | Refills: 0 | Status: SHIPPED | OUTPATIENT
Start: 2020-07-28 | End: 2020-08-04

## 2020-07-28 NOTE — ED PROVIDER NOTES
Patient Seen in: THE [facility] END      History   Patient presents with:  Rash Skin Problem    Stated Complaint: TL- Rash, x2days     HPI    Dora Moe is a 42-year-old female who presents today for evaluation of a painful rash to the right air)       Current:/77   Pulse 76   Temp 99 °F (37.2 °C) (Temporal)   Resp 16   Wt 76.2 kg   LMP 07/22/2020 (Approximate)   SpO2 100%   BMI 27.96 kg/m²         Physical Exam  Nursing note reviewed. Vital signs reviewed.   Constitutional: Oriented to p 1:15 pm    Follow-up:  Malena Gan DO  [address] [place] [address] [address]  [phone]                Medications Prescribed:  Discharge Medication List as of 7/28/2020  1:17 PM    START taking these medications    !! valACYclovir HCl 1 G Oral T

## 2020-07-28 NOTE — ED INITIAL ASSESSMENT (HPI)
Patient reports she has a rash on her right hip. Patient reports she had hip pain this weekend. Patient reports today is reddened and she has a rash.

## 2020-10-22 ENCOUNTER — APPOINTMENT (OUTPATIENT)
Dept: LAB | Facility: HOSPITAL | Age: 40
End: 2020-10-22
Attending: INTERNAL MEDICINE
Payer: COMMERCIAL

## 2020-10-22 ENCOUNTER — VIRTUAL PHONE E/M (OUTPATIENT)
Dept: FAMILY MEDICINE CLINIC | Facility: CLINIC | Age: 40
End: 2020-10-22
Payer: COMMERCIAL

## 2020-10-22 DIAGNOSIS — J06.9 VIRAL UPPER RESPIRATORY TRACT INFECTION: ICD-10-CM

## 2020-10-22 DIAGNOSIS — J06.9 VIRAL UPPER RESPIRATORY TRACT INFECTION: Primary | ICD-10-CM

## 2020-10-22 PROCEDURE — 99213 OFFICE O/P EST LOW 20 MIN: CPT | Performed by: INTERNAL MEDICINE

## 2020-10-22 RX ORDER — ALBUTEROL SULFATE 90 UG/1
2 AEROSOL, METERED RESPIRATORY (INHALATION) EVERY 4 HOURS PRN
Qty: 1 INHALER | Refills: 0 | Status: SHIPPED | OUTPATIENT
Start: 2020-10-22 | End: 2021-10-04

## 2020-10-22 NOTE — PROGRESS NOTES
Virtual Telephone Check-In    Doug De Oliveira verbally consents to a Virtual/Telephone Check-In visit on 10/22/20. Patient has been referred to the [facility] website at [email or web address] to review the yearly Consent to Treat document.     Patient Terra Bella 07/22/2020 (Approximate)   ENT: no hyponasal tone, no sneezing  LUNGS: speaking in full sentences; no cough; no audible wheeze  PSYCH: A&Ox3;  judgement and insight are appropriate, pleasant demeanor      ASSESSMENT AND PLAN:   Upper Respiratory Infection

## 2021-09-07 RX ORDER — VALACYCLOVIR HYDROCHLORIDE 1 G/1
TABLET, FILM COATED ORAL
Qty: 15 TABLET | Refills: 3 | Status: SHIPPED | OUTPATIENT
Start: 2021-09-07

## 2021-10-04 ENCOUNTER — LAB ENCOUNTER (OUTPATIENT)
Dept: LAB | Age: 41
End: 2021-10-04
Attending: INTERNAL MEDICINE
Payer: COMMERCIAL

## 2021-10-04 ENCOUNTER — OFFICE VISIT (OUTPATIENT)
Dept: FAMILY MEDICINE CLINIC | Facility: CLINIC | Age: 41
End: 2021-10-04
Payer: COMMERCIAL

## 2021-10-04 VITALS
OXYGEN SATURATION: 98 % | TEMPERATURE: 98 F | SYSTOLIC BLOOD PRESSURE: 110 MMHG | HEART RATE: 70 BPM | DIASTOLIC BLOOD PRESSURE: 78 MMHG | WEIGHT: 169 LBS | HEIGHT: 65 IN | BODY MASS INDEX: 28.16 KG/M2 | RESPIRATION RATE: 20 BRPM

## 2021-10-04 DIAGNOSIS — M25.541 ARTHRALGIA OF RIGHT HAND: ICD-10-CM

## 2021-10-04 DIAGNOSIS — Z00.00 ROUTINE GENERAL MEDICAL EXAMINATION AT A HEALTH CARE FACILITY: ICD-10-CM

## 2021-10-04 DIAGNOSIS — E55.9 VITAMIN D DEFICIENCY: ICD-10-CM

## 2021-10-04 DIAGNOSIS — E04.2 MULTIPLE THYROID NODULES: ICD-10-CM

## 2021-10-04 DIAGNOSIS — Z00.00 ROUTINE GENERAL MEDICAL EXAMINATION AT A HEALTH CARE FACILITY: Primary | ICD-10-CM

## 2021-10-04 PROBLEM — K58.0 IRRITABLE BOWEL SYNDROME WITH DIARRHEA: Status: ACTIVE | Noted: 2021-10-04

## 2021-10-04 PROCEDURE — 90471 IMMUNIZATION ADMIN: CPT | Performed by: INTERNAL MEDICINE

## 2021-10-04 PROCEDURE — 3078F DIAST BP <80 MM HG: CPT | Performed by: INTERNAL MEDICINE

## 2021-10-04 PROCEDURE — 90686 IIV4 VACC NO PRSV 0.5 ML IM: CPT | Performed by: INTERNAL MEDICINE

## 2021-10-04 PROCEDURE — 99396 PREV VISIT EST AGE 40-64: CPT | Performed by: INTERNAL MEDICINE

## 2021-10-04 PROCEDURE — 80061 LIPID PANEL: CPT | Performed by: INTERNAL MEDICINE

## 2021-10-04 PROCEDURE — 3008F BODY MASS INDEX DOCD: CPT | Performed by: INTERNAL MEDICINE

## 2021-10-04 PROCEDURE — 80050 GENERAL HEALTH PANEL: CPT | Performed by: INTERNAL MEDICINE

## 2021-10-04 PROCEDURE — 3074F SYST BP LT 130 MM HG: CPT | Performed by: INTERNAL MEDICINE

## 2021-10-04 PROCEDURE — 86431 RHEUMATOID FACTOR QUANT: CPT | Performed by: INTERNAL MEDICINE

## 2021-10-04 PROCEDURE — 82306 VITAMIN D 25 HYDROXY: CPT | Performed by: INTERNAL MEDICINE

## 2021-10-04 NOTE — PROGRESS NOTES
HPI:   Lola Fairbanks is a 35year old female who presents for an annual physical exam. Symptoms: denies discharge, itching, burning or dysuria, periods are regular. Patient's last menstrual period was 09/27/2021.   Previous pap: due this year, scheduled heaviness or cough  CV: denies chest pain, pressure or palpitations  GI: denies abdominal pain; denies heartburn, frequent diarrhea or constipation  : denies dysuria, vaginal discharge or itching; denies pelvic pain  MS: denies back pain; + right hand pantera FACTOR; Future  - XR FINGER(S) (MIN 2 VIEWS), RIGHT 2ND (CPT=47377); Future    4. Multiple thyroid nodules  - US THYROID (OMR=81181); Future        Manuel Padilla was given an opportunity to ask questions and verbalized understanding of care. Follow up 1 year.

## 2021-10-08 ENCOUNTER — HOSPITAL ENCOUNTER (OUTPATIENT)
Dept: MAMMOGRAPHY | Facility: HOSPITAL | Age: 41
Discharge: HOME | End: 2021-10-08
Attending: OBSTETRICS & GYNECOLOGY
Payer: COMMERCIAL

## 2021-10-08 PROCEDURE — 77063 BREAST TOMOSYNTHESIS BI: CPT | Performed by: OBSTETRICS & GYNECOLOGY

## 2021-10-08 PROCEDURE — 77067 SCR MAMMO BI INCL CAD: CPT | Performed by: OBSTETRICS & GYNECOLOGY

## 2021-10-28 ENCOUNTER — HOSPITAL ENCOUNTER (OUTPATIENT)
Dept: GENERAL RADIOLOGY | Age: 41
Discharge: HOME | End: 2021-10-28
Attending: INTERNAL MEDICINE
Payer: COMMERCIAL

## 2021-10-28 ENCOUNTER — HOSPITAL ENCOUNTER (OUTPATIENT)
Dept: ULTRASOUND IMAGING | Age: 41
Discharge: HOME | End: 2021-10-28
Attending: INTERNAL MEDICINE
Payer: COMMERCIAL

## 2021-10-28 DIAGNOSIS — E04.2 MULTIPLE THYROID NODULES: ICD-10-CM

## 2021-10-28 DIAGNOSIS — M25.541 ARTHRALGIA OF RIGHT HAND: ICD-10-CM

## 2021-10-28 PROCEDURE — 73140 X-RAY EXAM OF FINGER(S): CPT | Performed by: INTERNAL MEDICINE

## 2021-10-28 PROCEDURE — 76536 US EXAM OF HEAD AND NECK: CPT | Performed by: INTERNAL MEDICINE

## 2021-12-15 ENCOUNTER — PATIENT MESSAGE (OUTPATIENT)
Dept: FAMILY MEDICINE CLINIC | Facility: CLINIC | Age: 41
End: 2021-12-15

## 2021-12-15 NOTE — TELEPHONE ENCOUNTER
Ekaterina Bhatia, pt last seen 10/4/21. She's asking for a refill of an rx she said you gave for hemorrhoids a \"couple years ago\". I don't see one. She is also inquiring about proctofoam.   Do you want to see her or send Rx?

## 2021-12-15 NOTE — TELEPHONE ENCOUNTER
From: Jorge La  To: Erna Hensley NP  Sent: 12/15/2021 1:05 PM CST  Subject: Refill request     Hi Janel Rinne! Happy Holidays! I’m wondering if you could possibly result the hemorrhoid prescription cream you held for me and couple years ago?  Unf

## 2021-12-16 RX ORDER — HYDROCORTISONE 25 MG/G
1 CREAM TOPICAL 2 TIMES DAILY
Qty: 28 G | Refills: 1 | Status: SHIPPED | OUTPATIENT
Start: 2021-12-16 | End: 2021-12-30

## 2022-01-09 ENCOUNTER — PATIENT MESSAGE (OUTPATIENT)
Dept: FAMILY MEDICINE CLINIC | Facility: CLINIC | Age: 42
End: 2022-01-09

## 2022-01-10 NOTE — TELEPHONE ENCOUNTER
From: Suzanne Jamison  To: Sina Cordova NP  Sent: 1/9/2022 1:49 PM CST  Subject: Covid pcr testing for travel     [employer]. I need to schedule a pcr covid test for upcoming travel on the 26th.  I will need to secure a test on the 23 or 24th of Jan

## 2022-10-17 ENCOUNTER — HOSPITAL ENCOUNTER (OUTPATIENT)
Dept: MAMMOGRAPHY | Age: 42
Discharge: HOME | End: 2022-10-17
Attending: ADVANCED PRACTICE MIDWIFE
Payer: COMMERCIAL

## 2022-10-17 DIAGNOSIS — Z12.31 ENCOUNTER FOR SCREENING MAMMOGRAM FOR MALIGNANT NEOPLASM OF BREAST: ICD-10-CM

## 2022-10-17 PROCEDURE — 77063 BREAST TOMOSYNTHESIS BI: CPT | Performed by: ADVANCED PRACTICE MIDWIFE

## 2022-10-17 PROCEDURE — 77067 SCR MAMMO BI INCL CAD: CPT | Performed by: ADVANCED PRACTICE MIDWIFE

## 2023-01-29 ENCOUNTER — WALK IN (OUTPATIENT)
Dept: URGENT CARE | Age: 43
End: 2023-01-29

## 2023-01-29 VITALS
HEART RATE: 88 BPM | RESPIRATION RATE: 20 BRPM | SYSTOLIC BLOOD PRESSURE: 120 MMHG | DIASTOLIC BLOOD PRESSURE: 80 MMHG | HEIGHT: 66 IN | TEMPERATURE: 98.2 F | BODY MASS INDEX: 24.11 KG/M2 | WEIGHT: 150 LBS

## 2023-01-29 DIAGNOSIS — J10.1 INFLUENZA A: Primary | ICD-10-CM

## 2023-01-29 DIAGNOSIS — J02.9 SORETHROAT: ICD-10-CM

## 2023-01-29 LAB
FLUAV AG UPPER RESP QL IA.RAPID: POSITIVE
FLUBV AG UPPER RESP QL IA.RAPID: NEGATIVE
INTERNAL PROCEDURAL CONTROLS ACCEPTABLE: YES
S PYO AG THROAT QL IA.RAPID: NEGATIVE
SARS-COV+SARS-COV-2 AG RESP QL IA.RAPID: NOT DETECTED
TEST LOT EXPIRATION DATE: ABNORMAL
TEST LOT EXPIRATION DATE: NORMAL
TEST LOT NUMBER: ABNORMAL
TEST LOT NUMBER: NORMAL

## 2023-01-29 PROCEDURE — 87428 SARSCOV & INF VIR A&B AG IA: CPT | Performed by: NURSE PRACTITIONER

## 2023-01-29 PROCEDURE — 87880 STREP A ASSAY W/OPTIC: CPT | Performed by: NURSE PRACTITIONER

## 2023-01-29 PROCEDURE — 99213 OFFICE O/P EST LOW 20 MIN: CPT | Performed by: NURSE PRACTITIONER

## 2023-01-29 RX ORDER — BENZONATATE 100 MG/1
100 CAPSULE ORAL 3 TIMES DAILY PRN
Qty: 30 CAPSULE | Refills: 1 | Status: SHIPPED | OUTPATIENT
Start: 2023-01-29

## 2023-01-29 RX ORDER — BENZONATATE 100 MG/1
100 CAPSULE ORAL 3 TIMES DAILY PRN
Qty: 30 CAPSULE | Refills: 1 | Status: SHIPPED | OUTPATIENT
Start: 2023-01-29 | End: 2023-01-29 | Stop reason: CLARIF

## 2023-01-29 ASSESSMENT — ENCOUNTER SYMPTOMS
SORE THROAT: 1
CHEST TIGHTNESS: 0
CHILLS: 1
FATIGUE: 1
PSYCHIATRIC NEGATIVE: 1
SINUS PRESSURE: 0
NEUROLOGICAL NEGATIVE: 1
FEVER: 1
STRIDOR: 0
ALLERGIC/IMMUNOLOGIC NEGATIVE: 1
RHINORRHEA: 0
SINUS PAIN: 0
EYES NEGATIVE: 1
COUGH: 1
APNEA: 0
WHEEZING: 0
ENDOCRINE NEGATIVE: 1
SHORTNESS OF BREATH: 0
CHOKING: 0

## 2023-01-30 ENCOUNTER — PATIENT MESSAGE (OUTPATIENT)
Dept: FAMILY MEDICINE CLINIC | Facility: CLINIC | Age: 43
End: 2023-01-30

## 2023-04-20 PROBLEM — E04.2 MULTIPLE THYROID NODULES: Status: ACTIVE | Noted: 2023-04-20

## 2023-04-20 PROBLEM — F43.9 STRESS: Status: RESOLVED | Noted: 2018-12-10 | Resolved: 2023-04-20

## 2023-05-01 ENCOUNTER — OFFICE VISIT (OUTPATIENT)
Dept: FAMILY MEDICINE CLINIC | Facility: CLINIC | Age: 43
End: 2023-05-01
Payer: COMMERCIAL

## 2023-05-01 VITALS
DIASTOLIC BLOOD PRESSURE: 84 MMHG | WEIGHT: 176 LBS | SYSTOLIC BLOOD PRESSURE: 134 MMHG | BODY MASS INDEX: 29.32 KG/M2 | RESPIRATION RATE: 16 BRPM | HEART RATE: 76 BPM | HEIGHT: 65 IN | OXYGEN SATURATION: 98 %

## 2023-05-01 DIAGNOSIS — E04.1 THYROID NODULE: ICD-10-CM

## 2023-05-01 DIAGNOSIS — E55.9 VITAMIN D DEFICIENCY: ICD-10-CM

## 2023-05-01 DIAGNOSIS — Z00.00 ROUTINE GENERAL MEDICAL EXAMINATION AT A HEALTH CARE FACILITY: Primary | ICD-10-CM

## 2023-05-01 PROCEDURE — 3008F BODY MASS INDEX DOCD: CPT | Performed by: INTERNAL MEDICINE

## 2023-05-01 PROCEDURE — 3075F SYST BP GE 130 - 139MM HG: CPT | Performed by: INTERNAL MEDICINE

## 2023-05-01 PROCEDURE — 99396 PREV VISIT EST AGE 40-64: CPT | Performed by: INTERNAL MEDICINE

## 2023-05-01 PROCEDURE — 3079F DIAST BP 80-89 MM HG: CPT | Performed by: INTERNAL MEDICINE

## 2023-05-02 ENCOUNTER — LAB ENCOUNTER (OUTPATIENT)
Dept: LAB | Age: 43
End: 2023-05-02
Attending: INTERNAL MEDICINE
Payer: COMMERCIAL

## 2023-05-02 DIAGNOSIS — E55.9 VITAMIN D DEFICIENCY: ICD-10-CM

## 2023-05-02 DIAGNOSIS — Z00.00 ROUTINE GENERAL MEDICAL EXAMINATION AT A HEALTH CARE FACILITY: ICD-10-CM

## 2023-05-02 LAB
ALBUMIN SERPL-MCNC: 3.7 G/DL (ref 3.4–5)
ALBUMIN/GLOB SERPL: 1 {RATIO} (ref 1–2)
ALP LIVER SERPL-CCNC: 74 U/L
ALT SERPL-CCNC: 17 U/L
ANION GAP SERPL CALC-SCNC: 1 MMOL/L (ref 0–18)
AST SERPL-CCNC: 13 U/L (ref 15–37)
BASOPHILS # BLD AUTO: 0.07 X10(3) UL (ref 0–0.2)
BASOPHILS NFR BLD AUTO: 1.2 %
BILIRUB SERPL-MCNC: 0.3 MG/DL (ref 0.1–2)
BUN BLD-MCNC: 11 MG/DL (ref 7–18)
CALCIUM BLD-MCNC: 8.8 MG/DL (ref 8.5–10.1)
CHLORIDE SERPL-SCNC: 108 MMOL/L (ref 98–112)
CHOLEST SERPL-MCNC: 234 MG/DL (ref ?–200)
CO2 SERPL-SCNC: 29 MMOL/L (ref 21–32)
CREAT BLD-MCNC: 0.7 MG/DL
EOSINOPHIL # BLD AUTO: 0.25 X10(3) UL (ref 0–0.7)
EOSINOPHIL NFR BLD AUTO: 4.3 %
ERYTHROCYTE [DISTWIDTH] IN BLOOD BY AUTOMATED COUNT: 13.3 %
FASTING PATIENT LIPID ANSWER: YES
FASTING STATUS PATIENT QL REPORTED: YES
GFR SERPLBLD BASED ON 1.73 SQ M-ARVRAT: 109 ML/MIN/1.73M2 (ref 60–?)
GLOBULIN PLAS-MCNC: 3.8 G/DL (ref 2.8–4.4)
GLUCOSE BLD-MCNC: 93 MG/DL (ref 70–99)
HCT VFR BLD AUTO: 41.9 %
HDLC SERPL-MCNC: 53 MG/DL (ref 40–59)
HGB BLD-MCNC: 13.1 G/DL
IMM GRANULOCYTES # BLD AUTO: 0.03 X10(3) UL (ref 0–1)
IMM GRANULOCYTES NFR BLD: 0.5 %
LDLC SERPL CALC-MCNC: 160 MG/DL (ref ?–100)
LYMPHOCYTES # BLD AUTO: 1.28 X10(3) UL (ref 1–4)
LYMPHOCYTES NFR BLD AUTO: 22.3 %
MCH RBC QN AUTO: 29 PG (ref 26–34)
MCHC RBC AUTO-ENTMCNC: 31.3 G/DL (ref 31–37)
MCV RBC AUTO: 92.9 FL
MONOCYTES # BLD AUTO: 0.49 X10(3) UL (ref 0.1–1)
MONOCYTES NFR BLD AUTO: 8.5 %
NEUTROPHILS # BLD AUTO: 3.63 X10 (3) UL (ref 1.5–7.7)
NEUTROPHILS # BLD AUTO: 3.63 X10(3) UL (ref 1.5–7.7)
NEUTROPHILS NFR BLD AUTO: 63.2 %
NONHDLC SERPL-MCNC: 181 MG/DL (ref ?–130)
OSMOLALITY SERPL CALC.SUM OF ELEC: 285 MOSM/KG (ref 275–295)
PLATELET # BLD AUTO: 293 10(3)UL (ref 150–450)
POTASSIUM SERPL-SCNC: 4.4 MMOL/L (ref 3.5–5.1)
PROT SERPL-MCNC: 7.5 G/DL (ref 6.4–8.2)
RBC # BLD AUTO: 4.51 X10(6)UL
SODIUM SERPL-SCNC: 138 MMOL/L (ref 136–145)
TRIGL SERPL-MCNC: 119 MG/DL (ref 30–149)
TSI SER-ACNC: 2.94 MIU/ML (ref 0.36–3.74)
VIT D+METAB SERPL-MCNC: 22.2 NG/ML (ref 30–100)
VLDLC SERPL CALC-MCNC: 23 MG/DL (ref 0–30)
WBC # BLD AUTO: 5.8 X10(3) UL (ref 4–11)

## 2023-05-02 PROCEDURE — 80061 LIPID PANEL: CPT | Performed by: INTERNAL MEDICINE

## 2023-05-02 PROCEDURE — 82306 VITAMIN D 25 HYDROXY: CPT | Performed by: INTERNAL MEDICINE

## 2023-05-02 PROCEDURE — 80050 GENERAL HEALTH PANEL: CPT | Performed by: INTERNAL MEDICINE

## 2023-05-05 ENCOUNTER — HOSPITAL ENCOUNTER (OUTPATIENT)
Dept: ULTRASOUND IMAGING | Age: 43
Discharge: HOME | End: 2023-05-05
Attending: INTERNAL MEDICINE
Payer: COMMERCIAL

## 2023-05-05 DIAGNOSIS — E04.1 THYROID NODULE: ICD-10-CM

## 2023-05-05 PROCEDURE — 76536 US EXAM OF HEAD AND NECK: CPT | Performed by: INTERNAL MEDICINE

## 2023-05-06 DIAGNOSIS — E78.00 HIGH CHOLESTEROL: Primary | ICD-10-CM

## 2023-05-12 ENCOUNTER — PATIENT MESSAGE (OUTPATIENT)
Dept: FAMILY MEDICINE CLINIC | Facility: CLINIC | Age: 43
End: 2023-05-12

## 2023-05-12 NOTE — TELEPHONE ENCOUNTER
From: Crystal Erwin  To: Kelly Grande NP  Sent: 5/12/2023 7:17 AM CDT  Subject: Refill    Hi Evangelina Lucia, I forgot to ask during our appointment is there any chance you could refill my valacyclovir 1 g tablet prescription? I called it in today so they may be calling you! Monika Tinsley!

## 2023-05-14 RX ORDER — VALACYCLOVIR HYDROCHLORIDE 1 G/1
TABLET, FILM COATED ORAL
Qty: 15 TABLET | Refills: 3 | Status: SHIPPED | OUTPATIENT
Start: 2023-05-14

## 2023-12-21 ENCOUNTER — HOSPITAL ENCOUNTER (OUTPATIENT)
Dept: MAMMOGRAPHY | Age: 43
Discharge: HOME | End: 2023-12-21
Attending: ADVANCED PRACTICE MIDWIFE
Payer: COMMERCIAL

## 2023-12-21 DIAGNOSIS — Z12.31 SCREENING MAMMOGRAM FOR BREAST CANCER: ICD-10-CM

## 2023-12-21 PROCEDURE — 77067 SCR MAMMO BI INCL CAD: CPT | Performed by: ADVANCED PRACTICE MIDWIFE

## 2023-12-21 PROCEDURE — 77063 BREAST TOMOSYNTHESIS BI: CPT | Performed by: ADVANCED PRACTICE MIDWIFE

## 2024-01-03 ENCOUNTER — HOSPITAL ENCOUNTER (OUTPATIENT)
Dept: MAMMOGRAPHY | Facility: HOSPITAL | Age: 44
Discharge: HOME | End: 2024-01-03
Attending: ADVANCED PRACTICE MIDWIFE
Payer: COMMERCIAL

## 2024-01-03 DIAGNOSIS — R92.2 INCONCLUSIVE MAMMOGRAM: ICD-10-CM

## 2024-01-03 PROCEDURE — 77065 DX MAMMO INCL CAD UNI: CPT | Performed by: ADVANCED PRACTICE MIDWIFE

## 2024-01-03 PROCEDURE — 76642 ULTRASOUND BREAST LIMITED: CPT | Performed by: ADVANCED PRACTICE MIDWIFE

## 2024-01-03 PROCEDURE — 77061 BREAST TOMOSYNTHESIS UNI: CPT | Performed by: ADVANCED PRACTICE MIDWIFE

## 2024-01-25 PROBLEM — Z86.010 HISTORY OF ADENOMATOUS POLYP OF COLON: Status: ACTIVE | Noted: 2024-01-25

## 2024-01-25 PROBLEM — D12.3 BENIGN NEOPLASM OF TRANSVERSE COLON: Status: ACTIVE | Noted: 2024-01-25

## 2024-01-25 PROBLEM — Z86.0101 HISTORY OF ADENOMATOUS POLYP OF COLON: Status: ACTIVE | Noted: 2024-01-25

## 2024-02-13 ENCOUNTER — HOSPITAL ENCOUNTER (OUTPATIENT)
Dept: GENERAL RADIOLOGY | Age: 44
Discharge: HOME | End: 2024-02-13
Attending: INTERNAL MEDICINE
Payer: COMMERCIAL

## 2024-02-13 ENCOUNTER — OFFICE VISIT (OUTPATIENT)
Dept: FAMILY MEDICINE CLINIC | Facility: CLINIC | Age: 44
End: 2024-02-13
Payer: COMMERCIAL

## 2024-02-13 VITALS
WEIGHT: 176 LBS | SYSTOLIC BLOOD PRESSURE: 112 MMHG | OXYGEN SATURATION: 98 % | BODY MASS INDEX: 29.32 KG/M2 | HEIGHT: 65 IN | RESPIRATION RATE: 20 BRPM | HEART RATE: 86 BPM | DIASTOLIC BLOOD PRESSURE: 68 MMHG

## 2024-02-13 DIAGNOSIS — G89.29 CHRONIC PAIN OF LEFT KNEE: ICD-10-CM

## 2024-02-13 DIAGNOSIS — Z12.11 SCREENING FOR MALIGNANT NEOPLASM OF COLON: ICD-10-CM

## 2024-02-13 DIAGNOSIS — M25.562 CHRONIC PAIN OF LEFT KNEE: Primary | ICD-10-CM

## 2024-02-13 DIAGNOSIS — M25.562 CHRONIC PAIN OF LEFT KNEE: ICD-10-CM

## 2024-02-13 DIAGNOSIS — G89.29 CHRONIC PAIN OF LEFT KNEE: Primary | ICD-10-CM

## 2024-02-13 PROCEDURE — 3008F BODY MASS INDEX DOCD: CPT | Performed by: INTERNAL MEDICINE

## 2024-02-13 PROCEDURE — 99213 OFFICE O/P EST LOW 20 MIN: CPT | Performed by: INTERNAL MEDICINE

## 2024-02-13 PROCEDURE — 73562 X-RAY EXAM OF KNEE 3: CPT | Performed by: INTERNAL MEDICINE

## 2024-02-13 PROCEDURE — 3078F DIAST BP <80 MM HG: CPT | Performed by: INTERNAL MEDICINE

## 2024-02-13 PROCEDURE — 3074F SYST BP LT 130 MM HG: CPT | Performed by: INTERNAL MEDICINE

## 2024-02-13 NOTE — PROGRESS NOTES
Mary Jo Almendarez is a 43 year old female.  HPI:   November, fell on both knees tripping on a stair.  Both knees hurt but the left hurt more.  Right knee has improved and for the most part, not an issue.  The left knee still hurts with certain movements.  No swelling or bruising.  Hurts to touch on the outer side. Pain on the outer side also with certain movements.  Doesn't give out. Not locking up.     Current Outpatient Medications   Medication Sig Dispense Refill    valACYclovir 1 G Oral Tab 2 tabs po BID x 1 day prn cold sores 15 tablet 3      Past Medical History:   Diagnosis Date    Anxiety     Bloating     Constipation     Decorative tattoo     Headache disorder     Hemorrhoids     High cholesterol     Hypothyroidism     Irregular bowel habits      (normal spontaneous vaginal delivery) 10/20/2013    Unspecified hypothyroidism       Social History:  Social History     Socioeconomic History    Marital status:    Tobacco Use    Smoking status: Never    Smokeless tobacco: Never   Vaping Use    Vaping Use: Never used   Substance and Sexual Activity    Alcohol use: No     Comment: social    Drug use: No   Other Topics Concern    Caffeine Concern Yes     Comment: 2 cups of coffee daily    Exercise Yes     Comment: 2-3 times per week         REVIEW OF SYSTEMS:   GENERAL HEALTH: feels well otherwise  SKIN: denies any unusual skin lesions or rashes  RESPIRATORY: denies shortness of breath or cough  CARDIOVASCULAR: denies chest pain or pressure  MS: left knee pain as above  NEURO:  denies numbness or tingling    EXAM:   /68   Pulse 86   Resp 20   Ht 5' 5\" (1.651 m)   Wt 176 lb (79.8 kg)   LMP 2023 (Approximate)   SpO2 98%   BMI 29.29 kg/m²   GENERAL: well developed, well nourished,in no apparent distress  SKIN: warm & dry  HEENT: atraumatic, normocephalic, TMs clear, throat clear  NECK: supple,no adenopathy   LUNGS: CTA, easy breathing  MS: no quadriceps tendon tenderness; + lateral joint line  tenderness; no popliteal swelling or TTP; negative drawer test; + varus tenderness  EXT: no cyanosis, clubbing or edema    ASSESSMENT AND PLAN:   1. Chronic pain of left knee  - rule out lateral meniscal tear  - XR KNEE (3 VIEWS), LEFT (CPT=73562); Future  - [facility]    2. Screening for malignant neoplasm of colon  - referral given      The patient indicates understanding of these issues and agrees to the plan.  Follow up after XR, PT.

## 2024-09-28 ENCOUNTER — ORDER TRANSCRIPTION (OUTPATIENT)
Dept: ADMINISTRATIVE | Facility: HOSPITAL | Age: 44
End: 2024-09-28

## 2024-09-28 DIAGNOSIS — Z13.6 SCREENING FOR CARDIOVASCULAR CONDITION: Primary | ICD-10-CM

## 2024-09-30 ENCOUNTER — HOSPITAL ENCOUNTER (OUTPATIENT)
Dept: CT IMAGING | Facility: HOSPITAL | Age: 44
End: 2024-09-30
Attending: FAMILY MEDICINE

## 2024-09-30 DIAGNOSIS — Z13.6 SCREENING FOR CARDIOVASCULAR CONDITION: ICD-10-CM

## 2024-09-30 NOTE — PROGRESS NOTES
Date of Service 9/30/2024    MOJGAN GLASS  Date of Birth 2/19/1980    Patient Age: 44 year old    PCP: Judy Garcia, DO  [address] [place] [id number]    Heart Scan Consult  Preliminary Heart Scan Score: 0    Previous Screening  Heart Scan Completed Previously: No        Peripheral Vascular Scan Completed Previously: No          Risk Factors  Personal Risk Factors  Non-alterable Risk Factors: Personal History;Age;Gender;Family History  Alterable Risk Factors: Abnormal Cholesterol;Unhealthy eating          Blood Pressure  There were no vitals taken for this visit.  (Normal =< 120/80,  Elevated = 120-129/ >80,  High Stage1 130-139/80-89 , Stage2 >140/>90)    Lipid Profile  Cholesterol: 234, done on 5/2/2023.  HDL Cholesterol: 53, done on 5/2/2023.  LDL Cholesterol: 160, done on 5/2/2023.  TriGlycerides 119, done on 5/2/2023.    Cholesterol Goals  Value   Total  =< 200   HDL  = > 45 Men = > 55 Women   LDL   =< 100   Triglycerides  =< 150       Glucose and Hemoglobin A1C  Lab Results   Component Value Date    GLU 93 05/02/2023    A1C 4.9 10/29/2018     (Normal Fasting Glucose < 100mg/dl )    Nurse Review  Risk factor information and results reviewed with Nurse: Yes    Recommended Follow Up:  Consult your physician regarding::   Final Heart Scan Report;  Discuss potential for Incidental Finding;  Discuss Potential for Score Variance      Recommendations for Change:  Nutrition Changes: Low Saturated Fat;Low Fat Dairy;Increase Fiber    Cholesterol Modification (goal of therapy depends upon your risk):   Decrease LDL (Lousy/Bad) Ideal <100     (Today's NON-FASTING Cholestech Values:  Total Cholesterol-216, HDL-53, LDL-145, Triglycerides-86, Glucose-103)    Exercise: Enhance Current Program                   Repeat Heart Scan:   5 years if Calcium Score is 0.0              [place] Recommended Resources:  Recommended Resources: Upcoming Classes, Medical Services and Health Library [email or web address];    LIEN  Screening  Recommended PV Screening: Carotids;Abdomen;Ankle-Brachial Index (BRITT)      Other Resources:: Educational handouts provided.      Neymar ALCALA RN        Please Contact the Nurse Heart Line with any Questions or Concerns [phone].

## 2024-10-10 ENCOUNTER — TELEMEDICINE (OUTPATIENT)
Dept: FAMILY MEDICINE CLINIC | Facility: CLINIC | Age: 44
End: 2024-10-10
Payer: COMMERCIAL

## 2024-10-10 DIAGNOSIS — R93.89 ABNORMAL CT SCAN: Primary | ICD-10-CM

## 2024-10-10 PROCEDURE — 99212 OFFICE O/P EST SF 10 MIN: CPT | Performed by: INTERNAL MEDICINE

## 2024-10-14 DIAGNOSIS — R93.89 ABNORMAL CT SCAN: Primary | ICD-10-CM

## 2024-10-23 ENCOUNTER — HOSPITAL ENCOUNTER (OUTPATIENT)
Dept: CT IMAGING | Facility: HOSPITAL | Age: 44
Discharge: HOME | End: 2024-10-23
Attending: INTERNAL MEDICINE
Payer: COMMERCIAL

## 2024-10-23 DIAGNOSIS — R93.89 ABNORMAL CT SCAN: ICD-10-CM

## 2024-10-23 PROCEDURE — 71250 CT THORAX DX C-: CPT | Performed by: INTERNAL MEDICINE

## 2024-10-24 ENCOUNTER — TELEPHONE (OUTPATIENT)
Facility: CLINIC | Age: 44
End: 2024-10-24

## 2024-10-24 DIAGNOSIS — R91.8 PULMONARY NODULES/LESIONS, MULTIPLE: Primary | ICD-10-CM

## 2024-10-24 NOTE — TELEPHONE ENCOUNTER
Received referral to [facility] from Yaneli Farris NP for \"Incidental finding on screening heart scan. No symptoms.\"    \" CT chest performed on 10/23/2024 impression: Pulmonary nodules as described above.  Largest nodule is 7 mm ground-glass opacity within the right upper lobe.The findings include a single, incidentally detected, ground-glass pulmonary nodule, measuring more than or equal to 6mm.\"

## 2024-10-30 ENCOUNTER — OFFICE VISIT (OUTPATIENT)
Age: 44
End: 2024-10-30
Payer: COMMERCIAL

## 2024-10-30 VITALS
WEIGHT: 165 LBS | DIASTOLIC BLOOD PRESSURE: 74 MMHG | HEIGHT: 65 IN | HEART RATE: 61 BPM | OXYGEN SATURATION: 98 % | BODY MASS INDEX: 27.49 KG/M2 | RESPIRATION RATE: 16 BRPM | SYSTOLIC BLOOD PRESSURE: 112 MMHG

## 2024-10-30 DIAGNOSIS — J98.4 LUNG CYST: ICD-10-CM

## 2024-10-30 DIAGNOSIS — R91.8 GROUND GLASS OPACITY PRESENT ON IMAGING OF LUNG: Primary | ICD-10-CM

## 2024-10-30 PROCEDURE — 99204 OFFICE O/P NEW MOD 45 MIN: CPT | Performed by: INTERNAL MEDICINE

## 2024-10-30 PROCEDURE — 3008F BODY MASS INDEX DOCD: CPT | Performed by: INTERNAL MEDICINE

## 2024-10-30 PROCEDURE — 3078F DIAST BP <80 MM HG: CPT | Performed by: INTERNAL MEDICINE

## 2024-10-30 PROCEDURE — 3074F SYST BP LT 130 MM HG: CPT | Performed by: INTERNAL MEDICINE

## 2024-10-30 NOTE — PROGRESS NOTES
[facility] General Pulmonary Consult Note    Chief Complaint:  Chief Complaint   Patient presents with    New Patient     Lung nodule CT 10/23 pt denies dyspnea and cough        History of Present Illness:  Mary Jo Almendarez is a 44 year old female who presents today for evaluation of lung nodule and cyst in lung.  Patient is asymptomatic from a respiratory standpoint.  Patient obtained CT heart as listed below.  Never smoker.  No respiratory symptoms.  No history of lung cancer in immediate family.        Past Medical History:   Past Medical History:    Anxiety    Bloating    Constipation    Decorative tattoo    Headache disorder    Hemorrhoids    High cholesterol    Hyperlipidemia    Hypothyroidism    Irregular bowel habits     (normal spontaneous vaginal delivery) (HCC)    Obesity    Im overwright    Unspecified hypothyroidism        Past Surgical History:   Past Surgical History:   Procedure Laterality Date    Breast surgery      Colonoscopy      Codie localization wire 1 site left (cpt=19281) Left     benign          Other      left breast biopsy    Other surgical history      thyroid nodule biopsy    Radiation right Right     12/15/2022    Reduction left Left     12/15/2022    Suspension of breast Bilateral     12/15/2022    Tonsillectomy         Family Medical History:   Family History   Problem Relation Age of Onset    Colon Polyps Father     Hypertension Father     Heart Disorder Father     Cancer Father     Ulcerative Colitis Mother     Other (osteoprosis) Mother     Hypertension Maternal Grandmother     Heart Attack Maternal Grandmother     Cancer Paternal Grandfather         Lung        Social History:   Social History     Socioeconomic History    Marital status:      Spouse name: Not on file    Number of children: Not on file    Years of education: Not on file    Highest education level: Not on file   Occupational History    Not on file   Tobacco Use    Smoking status: Never     Passive exposure:  Past    Smokeless tobacco: Never   Vaping Use    Vaping status: Never Used   Substance and Sexual Activity    Alcohol use: Not Currently     Comment: Only once in a while    Drug use: No    Sexual activity: Not on file   Other Topics Concern    Caffeine Concern Yes     Comment: 2 cups of coffee daily    Exercise Yes     Comment: 2-3 times per week     Seat Belt Not Asked    Special Diet Not Asked    Stress Concern Not Asked    Weight Concern Not Asked   Social History Narrative    Not on file     Social Drivers of Health     Financial Resource Strain: Not on file   Food Insecurity: Not on file   Transportation Needs: Not on file   Physical Activity: Not on file   Stress: Not on file   Social Connections: Unknown (3/18/2021)    [facility]    Social Connections     Conversations with friends/family/neighbors per week: Not on file   Housing Stability: Low Risk  (7/7/2021)    [facility]    Housing Stability     Mortgage Payment Concerns?: Not on file     Number of Places Lived in the Last Year: Not on file     Unstable Housing?: Not on file        Allergies: Patient has no known allergies.     Medications:   Current Outpatient Medications   Medication Sig Dispense Refill    valACYclovir 1 G Oral Tab 2 tabs po BID x 1 day prn cold sores 15 tablet 3       Review of Systems: Review of Systems    Physical Exam:  /74 (BP Location: Right arm, Patient Position: Sitting, Cuff Size: adult)   Pulse 61   Resp 16   Ht 5' 5\" (1.651 m)   Wt 165 lb (74.8 kg)   SpO2 98%   BMI 27.46 kg/m²      Constitutional: alert, cooperative. No acute distress.  HEENT: Head NC/AT. Nares normal. Septum midline. Mucosa normal. No drainage or sinus tenderness.. Mallampati 2+  Cardio: Regular rate and rhythm. Normal S1 and S2. No murmurs.   Respiratory: Thorax symmetrical with no labored breathing. clear to  auscultation bilaterally  GI: NABS. Abd soft, non-tender.  Extremities: No clubbing or cyanosis. No BLE edema.    Neurologic: A&Ox3. No gross motor deficits.  Skin: Warm, dry  Psych: Calm, cooperative. Pleasant affect.    Results:  Personally reviewed  WBC: 5.8, done on 5/2/2023.  HGB: 13.1, done on 5/2/2023.  PLT: 293, done on 5/2/2023.     Last eGFR was 109 on 5/2/2023.  CA: 8.8, done on 5/2/2023.  Cl: 108, done on 5/2/2023.  CO2: 29, done on 5/2/2023.     CT CHEST (CPT=71250)    Result Date: 10/23/2024  CONCLUSION:  1. Pulmonary nodules as described above.  Largest nodule is 7 mm ground-glass opacity within the right upper lobe.The findings include a single, incidentally detected, ground-glass pulmonary nodule, measuring more than or equal to 6mm.  2017 guidelines from the Fleischner Society for the follow-up and management of newly detected indeterminate pulmonary nodules in persons at least 35 years of age depend on nodule size (average length and width) and underlying risk factors (including smoking and other risk factors). Please consider the following recommendations after clinical assessment of risk factors.  For nodules measuring more than or equal to 6mm: CT in 6 to 12 months to confirm persistence, then CT every 2 years until 5 years.  *If nodule grows or becomes increasingly solid, consider resection. 2. Interval development of ground-glass opacity within the left lower lobe which likely represent area of atelectasis but developing infectious etiology cannot be completely excluded.   LOCATION:  [place]   Dictated by (CST): Violet Reed MD on 10/23/2024 at 9:20 AM     Finalized by (CST): Violet Reed MD on 10/23/2024 at 9:47 AM       CT CALCIUM SCORING    Result Date: 10/1/2024  PROCEDURE:  CT CALCIUM SCORING  COMPARISON:  None.  INDICATIONS:  Z13.6 Screening for cardiovascular condition  TECHNIQUE:  The patient was placed in the supine position on the multidetector CT table and high-resolution  non-contrast limited CT images of the heart, coronary arteries and proximal great vessels were obtained. Dose reduction techniques were used. Dose  information is transmitted to the ACR (American College of Radiology) NRDR (National Radiology Data Registry) which includes the Dose Index Registry. This cardiac scan was interpreted by a cardiologist with respect to the heart only. Please consult the radiology report for further interpretation  FINDINGS:   REGION  CALCIUM SCORE  LEFT MAIN:  0 LEFT ANTERIOR DESCENDIN CIRCUMFLEX:  0 RIGHT CORONARY ARTERY:    0  TOTAL CALCIUM SCORE:  0   Calcium Score  Implication   0  No identifiable calcified plaque   1-100  Mild atherosclerotic plaque   101-300  Moderate atherosclerotic plaque   301-999  Moderate-severe atherosclerotic plaque   >1000  Severe atherosclerotic plaque  1. J Am Irene Cardiol Img. 2022 Nov, 15 [phone]  Clinical Relevance of Coronary Artery Scan  This patient identified you as their physician, if this is not correct please contact the Nurse [facility] at [phone] and they will assign this report to another physician.    Dictated by (CST): Arcadio Drew MD on 10/01/2024 at 10:10 AM     Finalized by (CST): Arcadio Drew MD on 10/01/2024 at 10:10 AM       CT CALCIUM SCORING OVER READ    Result Date: 2024  CONCLUSION:  1. No acute process noted. 2. 1.7 cm subpleural bleb versus pneumatocele within the posterior aspect of the left lower lobe. 3. Mild multilevel degenerative disc disease of the visualized thoracic spine with exaggeration of the thoracic kyphosis.    LOCATION:  [id number]   Dictated by (CST): Blanca Voss DO on 2024 at 8:44 AM     Finalized by (CST): Blanca Voss DO on 2024 at 8:47 AM         Assessment/Plan:  #1. 5 mm GGO  We reviewed CT scan together in detail  We discussed possibilities including but not limited to infection, inflammation, malignancy, etc.  Will plan to get CT in 1 year  If resolved no need to  follow up, if still present will need to be followed by 5 years    #2. 1 cyst on lung  We again reviewed cyst, showed how close it is to pleura and that patient is at slightly higher risk than avergae person for rupture  Would not work up further as present time unless symptoms worsen as it would not         Return in about 1 year (around 10/30/2025).    Trinidad Uribe MD  10/30/2024

## 2024-12-03 ENCOUNTER — OFFICE VISIT (OUTPATIENT)
Dept: FAMILY MEDICINE CLINIC | Facility: CLINIC | Age: 44
End: 2024-12-03
Payer: COMMERCIAL

## 2024-12-03 VITALS
BODY MASS INDEX: 27.66 KG/M2 | DIASTOLIC BLOOD PRESSURE: 74 MMHG | WEIGHT: 166 LBS | SYSTOLIC BLOOD PRESSURE: 112 MMHG | OXYGEN SATURATION: 99 % | HEIGHT: 65 IN | RESPIRATION RATE: 18 BRPM | HEART RATE: 78 BPM

## 2024-12-03 DIAGNOSIS — E78.00 HIGH CHOLESTEROL: ICD-10-CM

## 2024-12-03 DIAGNOSIS — Z00.00 ROUTINE GENERAL MEDICAL EXAMINATION AT A HEALTH CARE FACILITY: Primary | ICD-10-CM

## 2024-12-03 PROCEDURE — 3074F SYST BP LT 130 MM HG: CPT | Performed by: INTERNAL MEDICINE

## 2024-12-03 PROCEDURE — 99396 PREV VISIT EST AGE 40-64: CPT | Performed by: INTERNAL MEDICINE

## 2024-12-03 PROCEDURE — 3078F DIAST BP <80 MM HG: CPT | Performed by: INTERNAL MEDICINE

## 2024-12-03 PROCEDURE — 3008F BODY MASS INDEX DOCD: CPT | Performed by: INTERNAL MEDICINE

## 2024-12-03 RX ORDER — AMPICILLIN TRIHYDRATE 250 MG
CAPSULE ORAL
Qty: 30 CAPSULE | Refills: 3 | Status: SHIPPED | OUTPATIENT
Start: 2024-12-03

## 2024-12-03 RX ORDER — ROSUVASTATIN CALCIUM 5 MG/1
TABLET, COATED ORAL
Qty: 30 TABLET | Refills: 3 | Status: SHIPPED | OUTPATIENT
Start: 2024-12-03

## 2024-12-03 RX ORDER — PHENTERMINE HYDROCHLORIDE 37.5 MG/1
1 TABLET ORAL
COMMUNITY
End: 2024-12-03

## 2024-12-03 NOTE — PROGRESS NOTES
HPI:   Mary Jo Almendarez is a 44 year old female who presents for an annual physical exam. Symptoms:  sees GYNE .      Patient's last menstrual period was 2024 (approximate).  Previous pap: '  Performs SBEs:yes      Hx of HL. + FH of CAD. Trying to eat well.  Hx of pulmonary bleb, seen by Pulmonary, repeat CT 1 year. Low risk.                        Current Outpatient Medications   Medication Sig Dispense Refill    valACYclovir 1 G Oral Tab 2 tabs po BID x 1 day prn cold sores 15 tablet 3      Past Medical History:    Anxiety    Bloating    Constipation    Decorative tattoo    Headache disorder    Hemorrhoids    High cholesterol    Hyperlipidemia    Hypothyroidism    Irregular bowel habits     (normal spontaneous vaginal delivery) (HCC)    Obesity    Im overwright    Unspecified hypothyroidism      Past Surgical History:   Procedure Laterality Date    Breast surgery      Colonoscopy      Codie localization wire 1 site left (cpt=19281) Left     benign          Other      left breast biopsy    Other surgical history      thyroid nodule biopsy    Radiation right Right     12/15/2022    Reduction left Left     12/15/2022    Suspension of breast Bilateral     12/15/2022    Tonsillectomy        Family History   Problem Relation Age of Onset    Colon Polyps Father     Hypertension Father     Heart Disorder Father     Cancer Father     Ulcerative Colitis Mother     Other (osteoprosis) Mother     Hypertension Maternal Grandmother     Heart Attack Maternal Grandmother     Cancer Paternal Grandfather         Lung      Social History:   Social History     Socioeconomic History    Marital status:    Tobacco Use    Smoking status: Never     Passive exposure: Past    Smokeless tobacco: Never   Vaping Use    Vaping status: Never Used   Substance and Sexual Activity    Alcohol use: Not Currently     Comment: Only once in a while    Drug use: No   Other Topics Concern    Caffeine Concern Yes     Comment: 2 cups  of coffee daily    Exercise Yes     Comment: 2-3 times per week      Social Drivers of Health      [facility]    Social Connections    Received from [facility], [facility]    Housing Stability     Exercise:  yes .  Diet:  balanced     REVIEW OF SYSTEMS:   GENERAL: feels well otherwise  SKIN: denies unusual skin lesions  HEENT:no vision or hearing changes; denies nasal congestion, sinus pain or sore throat  LUNGS: denies shortness of breath, chest heaviness or cough  CV: denies chest pain, pressure or palpitations  GI: denies abdominal pain; denies heartburn, frequent diarrhea or constipation  : denies dysuria, vaginal discharge or itching; denies pelvic pain  MS: denies back pain  NEURO: denies headaches; no dizziness  PSYCH: denies depression or anxiety  HEME: denies hx of anemia  ENDOCRINE: denies thyroid history, denies excessive thirst, denies significant weight change  ALL/ASTHMA: denies hx of  allergy or asthma    EXAM:   /74   Pulse 78   Resp 18   Ht 5' 5\" (1.651 m)   Wt 166 lb (75.3 kg)   LMP 11/25/2024 (Approximate)   SpO2 99%   BMI 27.62 kg/m²       Body mass index is 27.62 kg/m².      GENERAL: pleasant and in no acute distress  SKIN: warm and dry  HEENT: atraumatic, normocephalic; PERRLA, conjunctiva clear; ears, nose and throat are clear  NECK: supple,no adenopathy,no thyromegaly  LUNGS: clear to auscultation, easy breathing  CV: normal S1S2, RRR without murmur  GI: BSs present, no tenderness or organomegaly  :no pelvic TTP  MS: Shreyas, no bony deformities  EXT: no cyanosis, clubbing or edema  NEURO: A&Ox3, motor and sensory are grossly intact    ASSESSMENT AND PLAN:   1. Routine general medical examination at a health care facility  Reinforced routine SBEs. Discussed the benefits of routine exercise, a heart healthy diet and annual flu vaccines.  - update pap with GYNE  - CBC With  Differential With Platelet; Future  - Comp Metabolic Panel (14); Future  - Lipid Panel; Future  - TSH and Free T4; Future  - Vitamin D; Future  - Vitamin B12; Future    2. High cholesterol  - Rosuvastatin 5mg M, W, F  - CoQ10 200mg M, W, F  - repeat lipids in 3 months  - continue heart healthy meals and routine exercise    3. Pulmonary bleb  - incidental finding with low risk, no hx  - repeat CT 1 year        Mary Jo was given an opportunity to ask questions and verbalized understanding of care. Follow up 1 year.             .

## 2024-12-17 ENCOUNTER — LAB ENCOUNTER (OUTPATIENT)
Dept: LAB | Facility: HOSPITAL | Age: 44
End: 2024-12-17
Attending: INTERNAL MEDICINE
Payer: COMMERCIAL

## 2024-12-17 DIAGNOSIS — Z00.00 ROUTINE GENERAL MEDICAL EXAMINATION AT A HEALTH CARE FACILITY: ICD-10-CM

## 2024-12-17 LAB
ALBUMIN SERPL-MCNC: 4.1 G/DL (ref 3.2–4.8)
ALBUMIN/GLOB SERPL: 1.2 {RATIO} (ref 1–2)
ALP LIVER SERPL-CCNC: 72 U/L
ALT SERPL-CCNC: <7 U/L
ANION GAP SERPL CALC-SCNC: 5 MMOL/L (ref 0–18)
AST SERPL-CCNC: 15 U/L (ref ?–34)
BASOPHILS # BLD AUTO: 0.03 X10(3) UL (ref 0–0.2)
BASOPHILS NFR BLD AUTO: 0.6 %
BILIRUB SERPL-MCNC: 0.5 MG/DL (ref 0.3–1.2)
BUN BLD-MCNC: 9 MG/DL (ref 9–23)
CALCIUM BLD-MCNC: 9.1 MG/DL (ref 8.7–10.4)
CHLORIDE SERPL-SCNC: 107 MMOL/L (ref 98–112)
CHOLEST SERPL-MCNC: 216 MG/DL (ref ?–200)
CO2 SERPL-SCNC: 26 MMOL/L (ref 21–32)
CREAT BLD-MCNC: 0.67 MG/DL
EGFRCR SERPLBLD CKD-EPI 2021: 109 ML/MIN/1.73M2 (ref 60–?)
EOSINOPHIL # BLD AUTO: 0.2 X10(3) UL (ref 0–0.7)
EOSINOPHIL NFR BLD AUTO: 3.8 %
ERYTHROCYTE [DISTWIDTH] IN BLOOD BY AUTOMATED COUNT: 13.6 %
FASTING PATIENT LIPID ANSWER: YES
FASTING STATUS PATIENT QL REPORTED: YES
GLOBULIN PLAS-MCNC: 3.5 G/DL (ref 2–3.5)
GLUCOSE BLD-MCNC: 87 MG/DL (ref 70–99)
HCT VFR BLD AUTO: 43 %
HDLC SERPL-MCNC: 53 MG/DL (ref 40–59)
HGB BLD-MCNC: 13.7 G/DL
IMM GRANULOCYTES # BLD AUTO: 0.01 X10(3) UL (ref 0–1)
IMM GRANULOCYTES NFR BLD: 0.2 %
LDLC SERPL CALC-MCNC: 145 MG/DL (ref ?–100)
LYMPHOCYTES # BLD AUTO: 1.38 X10(3) UL (ref 1–4)
LYMPHOCYTES NFR BLD AUTO: 25.9 %
MCH RBC QN AUTO: 28.7 PG (ref 26–34)
MCHC RBC AUTO-ENTMCNC: 31.9 G/DL (ref 31–37)
MCV RBC AUTO: 90 FL
MONOCYTES # BLD AUTO: 0.39 X10(3) UL (ref 0.1–1)
MONOCYTES NFR BLD AUTO: 7.3 %
NEUTROPHILS # BLD AUTO: 3.31 X10 (3) UL (ref 1.5–7.7)
NEUTROPHILS # BLD AUTO: 3.31 X10(3) UL (ref 1.5–7.7)
NEUTROPHILS NFR BLD AUTO: 62.2 %
NONHDLC SERPL-MCNC: 163 MG/DL (ref ?–130)
OSMOLALITY SERPL CALC.SUM OF ELEC: 284 MOSM/KG (ref 275–295)
PLATELET # BLD AUTO: 290 10(3)UL (ref 150–450)
POTASSIUM SERPL-SCNC: 4.2 MMOL/L (ref 3.5–5.1)
PROT SERPL-MCNC: 7.6 G/DL (ref 5.7–8.2)
RBC # BLD AUTO: 4.78 X10(6)UL
SODIUM SERPL-SCNC: 138 MMOL/L (ref 136–145)
T4 FREE SERPL-MCNC: 1.2 NG/DL (ref 0.8–1.7)
TRIGL SERPL-MCNC: 103 MG/DL (ref 30–149)
TSI SER-ACNC: 3.72 UIU/ML (ref 0.55–4.78)
VIT B12 SERPL-MCNC: 388 PG/ML (ref 211–911)
VIT D+METAB SERPL-MCNC: 20.5 NG/ML (ref 30–100)
VLDLC SERPL CALC-MCNC: 19 MG/DL (ref 0–30)
WBC # BLD AUTO: 5.3 X10(3) UL (ref 4–11)

## 2024-12-17 PROCEDURE — 80061 LIPID PANEL: CPT

## 2024-12-17 PROCEDURE — 84443 ASSAY THYROID STIM HORMONE: CPT

## 2024-12-17 PROCEDURE — 84439 ASSAY OF FREE THYROXINE: CPT

## 2024-12-17 PROCEDURE — 36415 COLL VENOUS BLD VENIPUNCTURE: CPT

## 2024-12-17 PROCEDURE — 80053 COMPREHEN METABOLIC PANEL: CPT

## 2024-12-17 PROCEDURE — 82306 VITAMIN D 25 HYDROXY: CPT

## 2024-12-17 PROCEDURE — 82607 VITAMIN B-12: CPT

## 2024-12-17 PROCEDURE — 85025 COMPLETE CBC W/AUTO DIFF WBC: CPT

## 2025-01-07 RX ORDER — VALACYCLOVIR HYDROCHLORIDE 1 G/1
TABLET, FILM COATED ORAL
Qty: 15 TABLET | Refills: 3 | Status: SHIPPED | OUTPATIENT
Start: 2025-01-07

## 2025-01-28 NOTE — PROGRESS NOTES
Video Visit  Mary Jo Almendarez is a 44 year old female.  No chief complaint on file.    Visit date: 10/10/24    HPI:   Pt requests a video visit to discuss CT follow up.  Abnormal results on CT, incidental finding from [facility].  No smoking.   No dyspnea.  No cough.  No exposure to metals, dyes,carbons, asbestos.  Hx of pneumonia      Current Outpatient Medications   Medication Sig Dispense Refill    valACYclovir 1 G Oral Tab TAKE 2 TABLETS BY MOUTH TWICE DAILY FOR 1 DAY AS NEEDED FOR COLD SORES 15 tablet 3    rosuvastatin 5 MG Oral Tab 1 tab po nightly 30 tablet 3    Coenzyme Q10 (COQ10) 200 MG Oral Cap 1 tab po nightly 30 capsule 3      Past Medical History:    Anxiety    Bloating    Constipation    Decorative tattoo    Headache disorder    Hemorrhoids    High cholesterol    Hyperlipidemia    Hypothyroidism    Irregular bowel habits     (normal spontaneous vaginal delivery) (HCC)    Obesity    Im overwright    Unspecified hypothyroidism      Past Surgical History:   Procedure Laterality Date    Breast surgery      Colonoscopy      Codie localization wire 1 site left (cpt=19281) Left     benign          Other      left breast biopsy    Other surgical history      thyroid nodule biopsy    Radiation right Right     12/15/2022    Reduction left Left     12/15/2022    Suspension of breast Bilateral     12/15/2022    Tonsillectomy        Social History:  Social History     Socioeconomic History    Marital status:    Tobacco Use    Smoking status: Never     Passive exposure: Past    Smokeless tobacco: Never   Vaping Use    Vaping status: Never Used   Substance and Sexual Activity    Alcohol use: Not Currently     Comment: Only once in a while    Drug use: No   Other Topics Concern    Caffeine Concern Yes     Comment: 2 cups of coffee daily    Exercise Yes     Comment: 2-3 times per week      Social Drivers of Health      Received from [facility], [facility]    Social  Connections    Received from [facility], [facility]    Housing Stability        REVIEW OF SYSTEMS:   GENERAL HEALTH: Denies fevers, chills or myalgias  HEENT: Denies ear pain, nasal congestion, sinus pain, or sore throat  SKIN: Denies rash  LUNGS: as above  CV: Denies chest pain or palpitations; denies lightheadedness  GI: Denies abdominal pain, denies N/V/D  MS: Denies back, neck or joint pain  NEURO: Denies headaches  ALLERGY/ASTHMA: Denies asthma and environmental allergies       EXAM:   GENERAL: well developed, well nourished, NAD.  SKIN: No rash visible  HEENT: AT/NC. Normal lips, gums and teeth; no hyponasal tone on video visit  LUNGS: Speaking in complete sentences comfortably without increased work of breathing; no cough during visit  PSYCH: Normal mood and affect, A&Ox3, affect is consistent with mood      ASSESSMENT AND PLAN:   Abnormal CT- referral to Pulmonary.        The patient indicates understanding of these issues and agrees to the plan.    Follow up prn    Duration of visit: 10 min      Mary Jo Almendarez understands a video evaluation is not a substitute for face-to-face examination or emergency care. Patient advised to go to ER or call 911 for worsening symptoms or acute distress.   Please note that the following visit was completed using two-way, real-time interactive video communication. This has been done in good donnell to provide continuity of care in the best interest of the provider-patient relationship, due to the ongoing public health crisis/national emergency and because of restrictions of visitation. There are limitations of this visit as limited physical exam could be performed. Every conscious effort was taken to allow for sufficient and adequate time. This billing was spent on reviewing labs, medications, radiology tests and decision making. Appropriate medical decision-making and tests are ordered as detailed in the plan of care above.

## 2025-02-01 ENCOUNTER — OFFICE VISIT (OUTPATIENT)
Dept: FAMILY MEDICINE CLINIC | Facility: CLINIC | Age: 45
End: 2025-02-01
Payer: COMMERCIAL

## 2025-02-01 VITALS
SYSTOLIC BLOOD PRESSURE: 119 MMHG | DIASTOLIC BLOOD PRESSURE: 82 MMHG | WEIGHT: 160 LBS | HEIGHT: 65 IN | RESPIRATION RATE: 18 BRPM | OXYGEN SATURATION: 98 % | HEART RATE: 83 BPM | BODY MASS INDEX: 26.66 KG/M2 | TEMPERATURE: 99 F

## 2025-02-01 DIAGNOSIS — J02.9 SORE THROAT: ICD-10-CM

## 2025-02-01 DIAGNOSIS — R68.89 FLU-LIKE SYMPTOMS: Primary | ICD-10-CM

## 2025-02-01 LAB
CONTROL LINE PRESENT WITH A CLEAR BACKGROUND (YES/NO): YES YES/NO
KIT LOT #: NORMAL NUMERIC

## 2025-02-01 PROCEDURE — 3074F SYST BP LT 130 MM HG: CPT | Performed by: NURSE PRACTITIONER

## 2025-02-01 PROCEDURE — 3079F DIAST BP 80-89 MM HG: CPT | Performed by: NURSE PRACTITIONER

## 2025-02-01 PROCEDURE — 87880 STREP A ASSAY W/OPTIC: CPT | Performed by: NURSE PRACTITIONER

## 2025-02-01 PROCEDURE — 99213 OFFICE O/P EST LOW 20 MIN: CPT | Performed by: NURSE PRACTITIONER

## 2025-02-01 PROCEDURE — 3008F BODY MASS INDEX DOCD: CPT | Performed by: NURSE PRACTITIONER

## 2025-02-01 PROCEDURE — 87637 SARSCOV2&INF A&B&RSV AMP PRB: CPT | Performed by: NURSE PRACTITIONER

## 2025-02-01 RX ORDER — OSELTAMIVIR PHOSPHATE 75 MG/1
75 CAPSULE ORAL 2 TIMES DAILY
Qty: 10 CAPSULE | Refills: 0 | Status: SHIPPED | OUTPATIENT
Start: 2025-02-01 | End: 2025-02-06

## 2025-02-01 NOTE — PROGRESS NOTES
HPI:   Mary Jo Almendarez is a 44 year old female who presents with ill symptoms for  2  days. Patient reports scratchy throat, congestion, fever to 100 last night. Has tried Advil with mild sore throat relief. Daughter has been sick, attends school in [place], on antibiotics and Prednisone for tonsillitis/syncope with illness, mother had flown there/back for assisting in care with recent return.    Current Outpatient Medications   Medication Sig Dispense Refill    valACYclovir 1 G Oral Tab TAKE 2 TABLETS BY MOUTH TWICE DAILY FOR 1 DAY AS NEEDED FOR COLD SORES 15 tablet 3    rosuvastatin 5 MG Oral Tab 1 tab po nightly 30 tablet 3    Coenzyme Q10 (COQ10) 200 MG Oral Cap 1 tab po nightly 30 capsule 3     No current facility-administered medications for this visit.      Past Medical History:    Anxiety    Bloating    Constipation    Decorative tattoo    Headache disorder    Hemorrhoids    High cholesterol    Hyperlipidemia    Hypothyroidism    Irregular bowel habits     (normal spontaneous vaginal delivery) (HCC)    Obesity    Im overwright    Unspecified hypothyroidism      Past Surgical History:   Procedure Laterality Date    Breast surgery      Colonoscopy      Codie localization wire 1 site left (cpt=19281) Left     benign          Other      left breast biopsy    Other surgical history      thyroid nodule biopsy    Radiation right Right     12/15/2022    Reduction left Left     12/15/2022    Suspension of breast Bilateral     12/15/2022    Tonsillectomy        Family History   Problem Relation Age of Onset    Ulcerative Colitis Mother     Other (osteoprosis) Mother     Colon Polyps Father     Hypertension Father     Heart Disorder Father     Cancer Father     Other (CABG) Father         CABG x 3    Hypertension Maternal Grandmother     Heart Attack Maternal Grandmother     Other (CABG) Maternal Grandmother     Cancer Paternal Grandfather         Lung      Social History     Socioeconomic History    Marital  status:    Tobacco Use    Smoking status: Never     Passive exposure: Past    Smokeless tobacco: Never   Vaping Use    Vaping status: Never Used   Substance and Sexual Activity    Alcohol use: Not Currently     Comment: Only once in a while    Drug use: No   Other Topics Concern    Caffeine Concern Yes     Comment: 2 cups of coffee daily    Exercise Yes     Comment: 2-3 times per week      Social Drivers of Health      Received from [facility], [facility]    Social Connections    Received from [facility], [facility]    Housing Stability         REVIEW OF SYSTEMS:   GENERAL: feels well otherwise, mildly fatigued  HEENT: congested, as above in HPI  LUNGS: denies shortness of breath with exertion  CARDIOVASCULAR: denies chest pain on exertion  GI: no nausea or abdominal pain, appetite down due to sore throat  NEURO: admits to mild headaches    EXAM:   /82   Pulse 83   Temp 98.8 °F (37.1 °C) (Oral)   Resp 18   Ht 5' 5\" (1.651 m)   Wt 160 lb (72.6 kg)   LMP 01/10/2025 (Approximate)   SpO2 98%   BMI 26.63 kg/m²   GENERAL: well developed, well nourished,in no apparent distress, appears congested but non toxic appearing  HEENT: atraumatic, normocephalic,ears clear, nares with minimal mucus, throat pink and clear. Uvuvla midline.  No sinus tenderness with palpation.  NECK: supple,anterior cervical adenopathy  LUNGS: clear to auscultation, breathing is non labored  CARDIO: RRR without murmur  Results for orders placed or performed in visit on 02/01/25   Strep A Assay W/Optic    Collection Time: 02/01/25 11:11 AM   Result Value Ref Range    Strep Grp A Screen neg Negative    Control Line Present with a clear background (yes/no) yes Yes/No    Kit Lot # [id number] Numeric    Kit Expiration Date 11/4/25 Date     Viral quad sent to lab.    ASSESSMENT AND PLAN:    PLAN:Mary Jo was seen today for cold.    Diagnoses and all orders for this  visit:    Flu-like symptoms  -     SARS-CoV-2/Flu A and B/RSV by PCR (Ghassan)    Sore throat  -     Strep A Assay W/Optic      Negative rapid strep. Viral quad sent. Self care discussed. Medication use and risk/benefit discussed. Patient is advised to follow up with PCP if not improving with treatment plan or seek immediate care if symptoms worsen.  The patient indicates understanding of these issues and agrees to the plan.  Patient Instructions   Self care for viral illnesses:  Start Tamiflu as directed on package. Take with food. You may stop this medication if your viral panel is negative for both Influenza A and B.  Saline nasal spray such as Ocean or Simply Saline to nostrils 2-3 times daily to help soothe tissues and keep mucus thin.  Salt water gargles (1 tsp. Salt in 6 oz lukewarm water), use several times daily to help remove post nasal drainage and soothe throat.  Hydrate! (cold or hot based on comfort). Drink lots of water or other non dehydrating liquids to help with illness.   May use humidifier in bedroom at night to help with congestion. Hot steamy showers can loosen congestion and help cough. Jorge's vapor rub to chest can quiet cough.  Hand washing-use hand  or wash hands frequently, cover your cough or sneeze, do not share towels or drinks with others.  May use Tylenol or Ibuprofen over the counter for pain/comfort if not contraindicated.  No work or school until fever free for 24 hours.  Follow up in 10-14 days after illness started with primary care if symptoms not complete resolved or sooner if worsening symptoms. Seek immediate care if inability to swallow or breathe or if symptoms improve greatly then worsen again.

## 2025-02-01 NOTE — PATIENT INSTRUCTIONS
Self care for viral illnesses:  Start Tamiflu as directed on package. Take with food. You may stop this medication if your viral panel is negative for both Influenza A and B.  Saline nasal spray such as Ocean or Simply Saline to nostrils 2-3 times daily to help soothe tissues and keep mucus thin.  Salt water gargles (1 tsp. Salt in 6 oz lukewarm water), use several times daily to help remove post nasal drainage and soothe throat.  Hydrate! (cold or hot based on comfort). Drink lots of water or other non dehydrating liquids to help with illness.   May use humidifier in bedroom at night to help with congestion. Hot steamy showers can loosen congestion and help cough. Jorge's vapor rub to chest can quiet cough.  Hand washing-use hand  or wash hands frequently, cover your cough or sneeze, do not share towels or drinks with others.  May use Tylenol or Ibuprofen over the counter for pain/comfort if not contraindicated.  No work or school until fever free for 24 hours.  Follow up in 10-14 days after illness started with primary care if symptoms not complete resolved or sooner if worsening symptoms. Seek immediate care if inability to swallow or breathe or if symptoms improve greatly then worsen again.

## 2025-02-02 LAB
FLUAV + FLUBV RNA SPEC NAA+PROBE: NEGATIVE
FLUAV + FLUBV RNA SPEC NAA+PROBE: NEGATIVE
RSV RNA SPEC NAA+PROBE: NEGATIVE
SARS-COV-2 RNA RESP QL NAA+PROBE: NOT DETECTED

## 2025-02-14 ENCOUNTER — HOSPITAL ENCOUNTER (OUTPATIENT)
Dept: MAMMOGRAPHY | Age: 45
Discharge: HOME | End: 2025-02-14
Attending: ADVANCED PRACTICE MIDWIFE
Payer: COMMERCIAL

## 2025-02-14 DIAGNOSIS — Z12.31 ENCOUNTER FOR SCREENING MAMMOGRAM FOR MALIGNANT NEOPLASM OF BREAST: ICD-10-CM

## 2025-02-14 PROCEDURE — 77067 SCR MAMMO BI INCL CAD: CPT | Performed by: ADVANCED PRACTICE MIDWIFE

## 2025-02-14 PROCEDURE — 77063 BREAST TOMOSYNTHESIS BI: CPT | Performed by: ADVANCED PRACTICE MIDWIFE

## 2025-03-22 ENCOUNTER — PATIENT MESSAGE (OUTPATIENT)
Dept: FAMILY MEDICINE CLINIC | Facility: CLINIC | Age: 45
End: 2025-03-22

## 2025-03-22 DIAGNOSIS — N95.1 PERIMENOPAUSAL: Primary | ICD-10-CM

## 2025-03-22 DIAGNOSIS — N95.1 PERIMENOPAUSAL SYMPTOMS: ICD-10-CM

## 2025-06-18 ENCOUNTER — E-ADVICE (OUTPATIENT)
Dept: INTERNAL MEDICINE | Age: 45
End: 2025-06-18

## 2025-06-18 SDOH — ECONOMIC STABILITY: FOOD INSECURITY: WITHIN THE PAST 12 MONTHS, THE FOOD YOU BOUGHT JUST DIDN'T LAST AND YOU DIDN'T HAVE MONEY TO GET MORE.: NEVER TRUE

## 2025-06-18 SDOH — ECONOMIC STABILITY: HOUSING INSECURITY: WHAT IS YOUR LIVING SITUATION TODAY?: I HAVE A STEADY PLACE TO LIVE

## 2025-06-18 SDOH — ECONOMIC STABILITY: TRANSPORTATION INSECURITY
IN THE PAST 12 MONTHS, HAS LACK OF RELIABLE TRANSPORTATION KEPT YOU FROM MEDICAL APPOINTMENTS, MEETINGS, WORK OR FROM GETTING THINGS NEEDED FOR DAILY LIVING?: NO

## 2025-06-18 SDOH — ECONOMIC STABILITY: HOUSING INSECURITY: DO YOU HAVE PROBLEMS WITH ANY OF THE FOLLOWING?: NONE OF THE ABOVE

## 2025-06-18 ASSESSMENT — SOCIAL DETERMINANTS OF HEALTH (SDOH): IN THE PAST 12 MONTHS, HAS THE ELECTRIC, GAS, OIL, OR WATER COMPANY THREATENED TO SHUT OFF SERVICE IN YOUR HOME?: NO

## 2025-06-20 ENCOUNTER — APPOINTMENT (OUTPATIENT)
Dept: INTERNAL MEDICINE | Age: 45
End: 2025-06-20

## 2025-06-20 DIAGNOSIS — N95.1 PERIMENOPAUSE: Primary | ICD-10-CM

## 2025-06-20 RX ORDER — VALACYCLOVIR HYDROCHLORIDE 1 G/1
TABLET, FILM COATED ORAL
COMMUNITY
Start: 2025-01-07

## 2025-06-20 RX ORDER — ESTRADIOL 0.05 MG/D
1 PATCH, EXTENDED RELEASE TRANSDERMAL
Qty: 8 PATCH | Refills: 2 | Status: SHIPPED | OUTPATIENT
Start: 2025-06-23

## 2025-08-27 ENCOUNTER — APPOINTMENT (OUTPATIENT)
Dept: INTERNAL MEDICINE | Age: 45
End: 2025-08-27

## 2025-08-27 DIAGNOSIS — N95.1 PERIMENOPAUSE: Primary | ICD-10-CM

## 2025-08-27 RX ORDER — PROGESTERONE 200 MG/1
200 CAPSULE ORAL DAILY
Qty: 30 CAPSULE | Refills: 2 | Status: SHIPPED | OUTPATIENT
Start: 2025-08-27

## 2025-08-27 ASSESSMENT — PATIENT HEALTH QUESTIONNAIRE - PHQ9
2. FEELING DOWN, DEPRESSED OR HOPELESS: NOT AT ALL
SUM OF ALL RESPONSES TO PHQ9 QUESTIONS 1 AND 2: 0
SUM OF ALL RESPONSES TO PHQ9 QUESTIONS 1 AND 2: 0
1. LITTLE INTEREST OR PLEASURE IN DOING THINGS: NOT AT ALL

## 2025-11-05 ENCOUNTER — APPOINTMENT (OUTPATIENT)
Dept: INTERNAL MEDICINE | Age: 45
End: 2025-11-05

## (undated) NOTE — Clinical Note
Patient was seen for their 1 month f/u at [facility] with a total weight loss of 13# since initial consult.

## (undated) NOTE — Clinical Note
Thank you for referring Radha Avery to the [facility]. I met with her in consultation today. I have ordered labs, set up a nutrition consultation with our dietician, and completed an EKG.   She was started on phentermine for medication therapy and